# Patient Record
Sex: FEMALE | Race: WHITE | NOT HISPANIC OR LATINO | Employment: OTHER | URBAN - METROPOLITAN AREA
[De-identification: names, ages, dates, MRNs, and addresses within clinical notes are randomized per-mention and may not be internally consistent; named-entity substitution may affect disease eponyms.]

---

## 2023-06-29 DIAGNOSIS — Z78.0 POST-MENOPAUSE: ICD-10-CM

## 2023-06-29 DIAGNOSIS — Z12.31 ENCOUNTER FOR SCREENING MAMMOGRAM FOR MALIGNANT NEOPLASM OF BREAST: ICD-10-CM

## 2023-07-10 ENCOUNTER — TELEPHONE (OUTPATIENT)
Dept: PRIMARY CARE | Facility: CLINIC | Age: 83
End: 2023-07-10
Payer: MEDICARE

## 2023-07-10 PROBLEM — Z95.2 S/P TAVR (TRANSCATHETER AORTIC VALVE REPLACEMENT): Status: ACTIVE | Noted: 2023-07-10

## 2023-07-10 PROBLEM — R63.4 WEIGHT LOSS, UNINTENTIONAL: Status: ACTIVE | Noted: 2023-07-10

## 2023-07-10 PROBLEM — M20.019 MALLET FINGER: Status: ACTIVE | Noted: 2023-07-10

## 2023-07-10 PROBLEM — I35.0 AORTIC STENOSIS: Status: ACTIVE | Noted: 2023-07-10

## 2023-07-10 PROBLEM — E55.9 VITAMIN D DEFICIENCY: Status: ACTIVE | Noted: 2023-07-10

## 2023-07-10 PROBLEM — J90 PLEURAL EFFUSION, BILATERAL: Status: ACTIVE | Noted: 2023-07-10

## 2023-07-10 PROBLEM — M81.0 OSTEOPOROSIS, POST-MENOPAUSAL: Status: ACTIVE | Noted: 2023-07-10

## 2023-07-10 PROBLEM — F41.8 SITUATIONAL ANXIETY: Status: ACTIVE | Noted: 2023-07-10

## 2023-07-10 PROBLEM — N32.81 OVERACTIVE BLADDER: Status: ACTIVE | Noted: 2023-07-10

## 2023-07-10 PROBLEM — R10.13 DYSPEPSIA: Status: ACTIVE | Noted: 2023-07-10

## 2023-07-10 PROBLEM — C43.61 MALIGNANT MELANOMA OF RIGHT FOREARM (MULTI): Status: ACTIVE | Noted: 2023-07-10

## 2023-07-10 PROBLEM — R91.1 LUNG NODULE: Status: ACTIVE | Noted: 2023-07-10

## 2023-07-10 PROBLEM — M51.36 LUMBAR DEGENERATIVE DISC DISEASE: Status: ACTIVE | Noted: 2023-07-10

## 2023-07-10 PROBLEM — M51.369 LUMBAR DEGENERATIVE DISC DISEASE: Status: ACTIVE | Noted: 2023-07-10

## 2023-07-10 PROBLEM — I10 HYPERTENSION: Status: ACTIVE | Noted: 2023-07-10

## 2023-07-10 PROBLEM — R73.03 PREDIABETES: Status: ACTIVE | Noted: 2023-07-10

## 2023-07-10 RX ORDER — BUTALB/ACETAMINOPHEN/CAFFEINE 50-325-40
2 TABLET ORAL DAILY
COMMUNITY
Start: 2015-06-01

## 2023-07-10 RX ORDER — CHOLECALCIFEROL (VITAMIN D3) 25 MCG
1 TABLET ORAL DAILY
COMMUNITY

## 2023-07-10 RX ORDER — POTASSIUM &MAGNESIUM ASPARTATE 250-250 MG
CAPSULE ORAL
COMMUNITY
Start: 2022-09-22

## 2023-07-10 RX ORDER — CLOPIDOGREL BISULFATE 75 MG/1
75 TABLET ORAL DAILY
COMMUNITY
Start: 2021-02-16 | End: 2023-10-25 | Stop reason: ALTCHOICE

## 2023-07-10 RX ORDER — GLUCOSAMINE/CHONDR SU A SOD 167-133 MG
500 CAPSULE ORAL
COMMUNITY
End: 2023-10-25 | Stop reason: ALTCHOICE

## 2023-07-10 RX ORDER — ASPIRIN 81 MG/1
1 TABLET ORAL DAILY
COMMUNITY
Start: 2021-02-16

## 2023-07-10 RX ORDER — METOPROLOL SUCCINATE 25 MG/1
25 TABLET, EXTENDED RELEASE ORAL
COMMUNITY
End: 2023-09-21

## 2023-07-10 RX ORDER — LISINOPRIL 5 MG/1
10 TABLET ORAL DAILY
COMMUNITY
End: 2023-10-25 | Stop reason: SDUPTHER

## 2023-07-10 RX ORDER — ACETAMINOPHEN 325 MG/1
325 TABLET ORAL EVERY 6 HOURS PRN
COMMUNITY
Start: 2021-02-16 | End: 2023-10-25 | Stop reason: ALTCHOICE

## 2023-09-21 DIAGNOSIS — I10 HYPERTENSION, UNSPECIFIED TYPE: ICD-10-CM

## 2023-09-21 RX ORDER — METOPROLOL SUCCINATE 25 MG/1
25 TABLET, EXTENDED RELEASE ORAL
Qty: 90 TABLET | Refills: 3 | Status: SHIPPED | OUTPATIENT
Start: 2023-09-21 | End: 2023-10-25 | Stop reason: ALTCHOICE

## 2023-10-16 ENCOUNTER — LAB (OUTPATIENT)
Dept: LAB | Facility: LAB | Age: 83
End: 2023-10-16
Payer: MEDICARE

## 2023-10-16 DIAGNOSIS — I10 ESSENTIAL (PRIMARY) HYPERTENSION: Primary | ICD-10-CM

## 2023-10-16 LAB
ALBUMIN SERPL BCP-MCNC: 4.2 G/DL (ref 3.4–5)
ALP SERPL-CCNC: 57 U/L (ref 33–136)
ALT SERPL W P-5'-P-CCNC: 14 U/L (ref 7–45)
ANION GAP SERPL CALC-SCNC: 14 MMOL/L (ref 10–20)
AST SERPL W P-5'-P-CCNC: 22 U/L (ref 9–39)
BASOPHILS # BLD AUTO: 0.04 X10*3/UL (ref 0–0.1)
BASOPHILS NFR BLD AUTO: 0.7 %
BILIRUB SERPL-MCNC: 0.6 MG/DL (ref 0–1.2)
BUN SERPL-MCNC: 13 MG/DL (ref 6–23)
CALCIUM SERPL-MCNC: 9.3 MG/DL (ref 8.6–10.3)
CHLORIDE SERPL-SCNC: 99 MMOL/L (ref 98–107)
CHOLEST SERPL-MCNC: 265 MG/DL (ref 0–199)
CHOLESTEROL/HDL RATIO: 3.2
CO2 SERPL-SCNC: 28 MMOL/L (ref 21–32)
CREAT SERPL-MCNC: 0.8 MG/DL (ref 0.5–1.05)
EOSINOPHIL # BLD AUTO: 0.14 X10*3/UL (ref 0–0.4)
EOSINOPHIL NFR BLD AUTO: 2.5 %
ERYTHROCYTE [DISTWIDTH] IN BLOOD BY AUTOMATED COUNT: 12.8 % (ref 11.5–14.5)
GFR SERPL CREATININE-BSD FRML MDRD: 73 ML/MIN/1.73M*2
GLUCOSE SERPL-MCNC: 83 MG/DL (ref 74–99)
HCT VFR BLD AUTO: 40.8 % (ref 36–46)
HDLC SERPL-MCNC: 83.2 MG/DL
HGB BLD-MCNC: 13 G/DL (ref 12–16)
IMM GRANULOCYTES # BLD AUTO: 0.01 X10*3/UL (ref 0–0.5)
IMM GRANULOCYTES NFR BLD AUTO: 0.2 % (ref 0–0.9)
LDLC SERPL CALC-MCNC: 167 MG/DL
LYMPHOCYTES # BLD AUTO: 2.25 X10*3/UL (ref 0.8–3)
LYMPHOCYTES NFR BLD AUTO: 40.6 %
MCH RBC QN AUTO: 30.3 PG (ref 26–34)
MCHC RBC AUTO-ENTMCNC: 31.9 G/DL (ref 32–36)
MCV RBC AUTO: 95 FL (ref 80–100)
MONOCYTES # BLD AUTO: 0.37 X10*3/UL (ref 0.05–0.8)
MONOCYTES NFR BLD AUTO: 6.7 %
NEUTROPHILS # BLD AUTO: 2.73 X10*3/UL (ref 1.6–5.5)
NEUTROPHILS NFR BLD AUTO: 49.3 %
NON HDL CHOLESTEROL: 182 MG/DL (ref 0–149)
NRBC BLD-RTO: 0 /100 WBCS (ref 0–0)
PLATELET # BLD AUTO: 281 X10*3/UL (ref 150–450)
PMV BLD AUTO: 10.4 FL (ref 7.5–11.5)
POTASSIUM SERPL-SCNC: 4.5 MMOL/L (ref 3.5–5.3)
PROT SERPL-MCNC: 6.7 G/DL (ref 6.4–8.2)
RBC # BLD AUTO: 4.29 X10*6/UL (ref 4–5.2)
SODIUM SERPL-SCNC: 136 MMOL/L (ref 136–145)
TRIGL SERPL-MCNC: 72 MG/DL (ref 0–149)
VLDL: 14 MG/DL (ref 0–40)
WBC # BLD AUTO: 5.5 X10*3/UL (ref 4.4–11.3)

## 2023-10-16 PROCEDURE — 80053 COMPREHEN METABOLIC PANEL: CPT

## 2023-10-16 PROCEDURE — 80061 LIPID PANEL: CPT

## 2023-10-16 PROCEDURE — 36415 COLL VENOUS BLD VENIPUNCTURE: CPT

## 2023-10-16 PROCEDURE — 85025 COMPLETE CBC W/AUTO DIFF WBC: CPT

## 2023-10-25 ENCOUNTER — OFFICE VISIT (OUTPATIENT)
Dept: PRIMARY CARE | Facility: CLINIC | Age: 83
End: 2023-10-25
Payer: MEDICARE

## 2023-10-25 VITALS
DIASTOLIC BLOOD PRESSURE: 68 MMHG | RESPIRATION RATE: 16 BRPM | BODY MASS INDEX: 24.47 KG/M2 | SYSTOLIC BLOOD PRESSURE: 120 MMHG | HEIGHT: 61 IN | WEIGHT: 129.6 LBS | TEMPERATURE: 98.5 F | HEART RATE: 65 BPM | OXYGEN SATURATION: 97 %

## 2023-10-25 DIAGNOSIS — K59.00 CONSTIPATION, UNSPECIFIED CONSTIPATION TYPE: ICD-10-CM

## 2023-10-25 DIAGNOSIS — Z78.9 NEVER SMOKED CIGARETTES: ICD-10-CM

## 2023-10-25 DIAGNOSIS — N32.81 OVERACTIVE BLADDER: ICD-10-CM

## 2023-10-25 DIAGNOSIS — M81.0 AGE RELATED OSTEOPOROSIS, UNSPECIFIED PATHOLOGICAL FRACTURE PRESENCE: ICD-10-CM

## 2023-10-25 DIAGNOSIS — N39.46 MIXED STRESS AND URGE URINARY INCONTINENCE: ICD-10-CM

## 2023-10-25 DIAGNOSIS — R21 RASH: ICD-10-CM

## 2023-10-25 DIAGNOSIS — I10 PRIMARY HYPERTENSION: ICD-10-CM

## 2023-10-25 DIAGNOSIS — Z00.00 MEDICARE ANNUAL WELLNESS VISIT, SUBSEQUENT: ICD-10-CM

## 2023-10-25 DIAGNOSIS — H25.033 ANTERIOR SUBCAPSULAR POLAR AGE-RELATED CATARACT OF BOTH EYES: Primary | ICD-10-CM

## 2023-10-25 PROBLEM — Z85.828 PERSONAL HISTORY OF OTHER MALIGNANT NEOPLASM OF SKIN: Status: ACTIVE | Noted: 2019-08-22

## 2023-10-25 PROBLEM — R11.10 RETCHING: Status: ACTIVE | Noted: 2023-10-25

## 2023-10-25 PROBLEM — L73.8 OTHER SPECIFIED FOLLICULAR DISORDERS: Status: ACTIVE | Noted: 2019-08-22

## 2023-10-25 PROBLEM — D49.2 NEOPLASM OF UNSPECIFIED BEHAVIOR OF BONE, SOFT TISSUE, AND SKIN: Status: ACTIVE | Noted: 2019-08-22

## 2023-10-25 PROBLEM — R79.89 ABNORMAL CBC: Status: ACTIVE | Noted: 2023-10-25

## 2023-10-25 PROBLEM — L57.0 ACTINIC KERATOSIS: Status: ACTIVE | Noted: 2019-08-22

## 2023-10-25 PROBLEM — D22.5 MELANOCYTIC NEVI OF TRUNK: Status: ACTIVE | Noted: 2019-08-22

## 2023-10-25 PROBLEM — I87.2 CHRONIC VENOUS INSUFFICIENCY: Status: ACTIVE | Noted: 2019-08-22

## 2023-10-25 PROBLEM — L30.1 DYSHIDROSIS (POMPHOLYX): Status: ACTIVE | Noted: 2019-08-22

## 2023-10-25 PROBLEM — L60.1 ONYCHOLYSIS: Status: ACTIVE | Noted: 2019-08-22

## 2023-10-25 PROBLEM — E78.5 DYSLIPIDEMIA: Status: ACTIVE | Noted: 2023-10-25

## 2023-10-25 PROCEDURE — 1159F MED LIST DOCD IN RCRD: CPT | Performed by: FAMILY MEDICINE

## 2023-10-25 PROCEDURE — G0446 INTENS BEHAVE THER CARDIO DX: HCPCS | Performed by: FAMILY MEDICINE

## 2023-10-25 PROCEDURE — 1126F AMNT PAIN NOTED NONE PRSNT: CPT | Performed by: FAMILY MEDICINE

## 2023-10-25 PROCEDURE — 99397 PER PM REEVAL EST PAT 65+ YR: CPT | Performed by: FAMILY MEDICINE

## 2023-10-25 PROCEDURE — 1170F FXNL STATUS ASSESSED: CPT | Performed by: FAMILY MEDICINE

## 2023-10-25 PROCEDURE — 3074F SYST BP LT 130 MM HG: CPT | Performed by: FAMILY MEDICINE

## 2023-10-25 PROCEDURE — 1036F TOBACCO NON-USER: CPT | Performed by: FAMILY MEDICINE

## 2023-10-25 PROCEDURE — 3078F DIAST BP <80 MM HG: CPT | Performed by: FAMILY MEDICINE

## 2023-10-25 PROCEDURE — 99214 OFFICE O/P EST MOD 30 MIN: CPT | Performed by: FAMILY MEDICINE

## 2023-10-25 PROCEDURE — G0439 PPPS, SUBSEQ VISIT: HCPCS | Performed by: FAMILY MEDICINE

## 2023-10-25 RX ORDER — BIOTIN 5 MG
5 TABLET ORAL
COMMUNITY
End: 2023-10-25 | Stop reason: ALTCHOICE

## 2023-10-25 RX ORDER — LISINOPRIL 10 MG/1
10 TABLET ORAL DAILY
Qty: 90 TABLET
Start: 2023-10-25 | End: 2024-10-24

## 2023-10-25 RX ORDER — HYDROCORTISONE 25 MG/G
OINTMENT TOPICAL 2 TIMES DAILY PRN
Qty: 30 G | Refills: 2 | Status: SHIPPED | OUTPATIENT
Start: 2023-10-25 | End: 2024-10-24

## 2023-10-25 ASSESSMENT — ANXIETY QUESTIONNAIRES
7. FEELING AFRAID AS IF SOMETHING AWFUL MIGHT HAPPEN: NOT AT ALL
6. BECOMING EASILY ANNOYED OR IRRITABLE: SEVERAL DAYS
2. NOT BEING ABLE TO STOP OR CONTROL WORRYING: SEVERAL DAYS
3. WORRYING TOO MUCH ABOUT DIFFERENT THINGS: NOT AT ALL
GAD7 TOTAL SCORE: 4
1. FEELING NERVOUS, ANXIOUS, OR ON EDGE: MORE THAN HALF THE DAYS
5. BEING SO RESTLESS THAT IT IS HARD TO SIT STILL: NOT AT ALL
4. TROUBLE RELAXING: NOT AT ALL
IF YOU CHECKED OFF ANY PROBLEMS ON THIS QUESTIONNAIRE, HOW DIFFICULT HAVE THESE PROBLEMS MADE IT FOR YOU TO DO YOUR WORK, TAKE CARE OF THINGS AT HOME, OR GET ALONG WITH OTHER PEOPLE: SOMEWHAT DIFFICULT

## 2023-10-25 ASSESSMENT — ENCOUNTER SYMPTOMS
CHILLS: 0
ABDOMINAL PAIN: 0
BRUISES/BLEEDS EASILY: 0
NECK STIFFNESS: 0
BACK PAIN: 0
POLYDIPSIA: 0
EYE PAIN: 0
HALLUCINATIONS: 0
ADENOPATHY: 0
FEVER: 0
WEAKNESS: 0
LOSS OF SENSATION IN FEET: 0
HEADACHES: 0
WOUND: 0
BLOOD IN STOOL: 0
DEPRESSION: 0
SHORTNESS OF BREATH: 0
DYSURIA: 0
EYE REDNESS: 0
PALPITATIONS: 0
OCCASIONAL FEELINGS OF UNSTEADINESS: 0
FATIGUE: 0
COUGH: 0
RHINORRHEA: 0
HEMATURIA: 0
SORE THROAT: 0

## 2023-10-25 ASSESSMENT — PATIENT HEALTH QUESTIONNAIRE - PHQ9
SUM OF ALL RESPONSES TO PHQ QUESTIONS 1-9: 7
8. MOVING OR SPEAKING SO SLOWLY THAT OTHER PEOPLE COULD HAVE NOTICED. OR THE OPPOSITE, BEING SO FIGETY OR RESTLESS THAT YOU HAVE BEEN MOVING AROUND A LOT MORE THAN USUAL: NOT AT ALL
10. IF YOU CHECKED OFF ANY PROBLEMS, HOW DIFFICULT HAVE THESE PROBLEMS MADE IT FOR YOU TO DO YOUR WORK, TAKE CARE OF THINGS AT HOME, OR GET ALONG WITH OTHER PEOPLE: SOMEWHAT DIFFICULT
SUM OF ALL RESPONSES TO PHQ9 QUESTIONS 1 AND 2: 4
5. POOR APPETITE OR OVEREATING: NOT AT ALL
3. TROUBLE FALLING OR STAYING ASLEEP OR SLEEPING TOO MUCH: MORE THAN HALF THE DAYS
2. FEELING DOWN, DEPRESSED OR HOPELESS: MORE THAN HALF THE DAYS
1. LITTLE INTEREST OR PLEASURE IN DOING THINGS: MORE THAN HALF THE DAYS
6. FEELING BAD ABOUT YOURSELF - OR THAT YOU ARE A FAILURE OR HAVE LET YOURSELF OR YOUR FAMILY DOWN: NOT AT ALL
4. FEELING TIRED OR HAVING LITTLE ENERGY: NOT AT ALL
9. THOUGHTS THAT YOU WOULD BE BETTER OFF DEAD, OR OF HURTING YOURSELF: NOT AT ALL
7. TROUBLE CONCENTRATING ON THINGS, SUCH AS READING THE NEWSPAPER OR WATCHING TELEVISION: SEVERAL DAYS

## 2023-10-25 ASSESSMENT — ACTIVITIES OF DAILY LIVING (ADL)
DRESSING: INDEPENDENT
TAKING_MEDICATION: INDEPENDENT
MANAGING_FINANCES: INDEPENDENT
BATHING: INDEPENDENT
GROCERY_SHOPPING: INDEPENDENT
DOING_HOUSEWORK: INDEPENDENT

## 2023-10-25 NOTE — PROGRESS NOTES
"Subjective   Reason for Visit: Joan Brown is an 82 y.o. female here for a Medicare Wellness visit.    And recheck on HTN and OAB.     Past Medical, Surgical, and Family History reviewed and updated in chart.    Reviewed all medications by prescribing practitioner or clinical pharmacist (such as prescriptions, OTCs, herbal therapies and supplements) and documented in the medical record.        Patient Care Team:  Dionisio Abreu MD as PCP - General  Dionisio Abreu MD as PCP - [employer] PCP     Review of Systems   Constitutional:  Negative for chills, fatigue and fever.   HENT:  Negative for rhinorrhea and sore throat.    Eyes:  Negative for pain and redness.   Respiratory:  Negative for cough and shortness of breath.    Cardiovascular:  Negative for chest pain and palpitations.   Gastrointestinal:  Negative for abdominal pain and blood in stool.   Endocrine: Negative for polydipsia and polyuria.   Genitourinary:  Negative for dysuria and hematuria.   Musculoskeletal:  Negative for back pain and neck stiffness.   Skin:  Negative for rash and wound.   Allergic/Immunologic: Negative for environmental allergies and food allergies.   Neurological:  Negative for weakness and headaches.   Hematological:  Negative for adenopathy. Does not bruise/bleed easily.   Psychiatric/Behavioral:  Negative for hallucinations and suicidal ideas.        Objective   Vitals:  /68 (BP Location: Right arm, Patient Position: Sitting, BP Cuff Size: Adult)   Pulse 65   Temp 36.9 °C (98.5 °F) (Temporal)   Resp 16   Ht 1.549 m (5' 1\")   Wt 58.8 kg (129 lb 9.6 oz)   SpO2 97%   BMI 24.49 kg/m²       Physical Exam  Vitals reviewed.   Constitutional:       General: She is not in acute distress.     Appearance: She is not ill-appearing.   HENT:      Head: Normocephalic and atraumatic.      Right Ear: Tympanic membrane normal.      Left Ear: Tympanic membrane normal.      Nose: No congestion or rhinorrhea.     "  Mouth/Throat:      Pharynx: No oropharyngeal exudate or posterior oropharyngeal erythema.   Eyes:      Extraocular Movements: Extraocular movements intact.      Conjunctiva/sclera: Conjunctivae normal.      Pupils: Pupils are equal, round, and reactive to light.   Cardiovascular:      Rate and Rhythm: Normal rate and regular rhythm.      Heart sounds: No murmur heard.     No friction rub. No gallop.   Pulmonary:      Effort: Pulmonary effort is normal.      Breath sounds: Normal breath sounds. No wheezing, rhonchi or rales.   Abdominal:      General: There is no distension.      Palpations: Abdomen is soft.      Tenderness: There is no abdominal tenderness. There is no guarding or rebound.   Musculoskeletal:         General: No swelling or deformity.      Cervical back: Normal range of motion and neck supple.      Right lower leg: No edema.      Left lower leg: No edema.   Skin:     Capillary Refill: Capillary refill takes less than 2 seconds.      Coloration: Skin is not jaundiced.      Findings: No rash.   Neurological:      General: No focal deficit present.      Mental Status: She is alert.      Motor: No weakness.   Psychiatric:         Mood and Affect: Mood normal.         Behavior: Behavior normal.       Assessment/Plan   Problem List Items Addressed This Visit       Hypertension    Overview     TOPROL  LISINOPRIL         Relevant Medications    lisinopril 10 mg tablet    Overactive bladder    Overview     Overactive bladder -- constipation and dry mouth on anticholinergics.  Never really helped.    Incontinence -- requires daily pad.     Refer to Dr Urena.          Relevant Orders    Referral to Urogynecology    Medicare annual wellness visit, subsequent    Overview     10/25/23 Wellness/cpe covered by [facility] -- annual mammo. every 2 year dexa has been stable. Screening colonoscopy -- no further screening required after age 75.      (Bill 9/2020) -- just had services in 2023 d/t covid.     UTD per  patient on pneumonia and tetanus, zostavax, vaccines. Prevnar 4/11/17.     Pain level 0/10.      10/25/23 Medicare wellness subsequent eval -- no falls, no dementia, no depression, vaccines utd, no loss of adls. No alcohol. no tobacco. no opioids. No hospitalization.      10/25/23 I spent 15 minutes face-to-face with this individual discussing their cardiovascular risk and behavioral therapies of nutritional choices, exercise, and elimination of habits contributing to risk. We agreed on a plan addressing reduction of their current cardiovascular risk. Patient's 10 year CV risk estimate calculates to: 3.2 %      # Left great toe 1st PIP valgus deformity -- to see DPM Dr Perez.      # Muscle cramping -- potassium on low side -- encourage OTC Magnesium/Potassium supplement at bedtime.     # Left foot plantar wart -- treated per podiatry Dr Perez.     # Right eye skin tag -- tiny not bothering her -- Dr Mojica did not think it needed to remove.   # Hx BPPV -- stable now.   # Hx of lymphoma -- follow up with Dr Law in May. Completed chemo 2012.    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   # hx Right ring finger palmar tendon contracture -- referred to hand surgeon.          Constipation    Overview     Using senna tea. Plus 3 colace caps.  Every 3rd day.     Try bulk laxative.          Anterior subcapsular polar age-related cataract of both eyes - Primary    Overview     Plans removal 11/6 and 11/30 per Dr MOJICA.           Other Visit Diagnoses       Body mass index (BMI) of 24.0 to 24.9 in adult        Never smoked cigarettes        Rash        Relevant Medications    hydrocortisone 2.5 % ointment    Mixed stress and urge urinary incontinence        Age related osteoporosis, unspecified pathological fracture presence        Relevant Medications    denosumab (Prolia) 60 mg/mL syringe

## 2023-11-21 ENCOUNTER — CLINICAL SUPPORT (OUTPATIENT)
Dept: PRIMARY CARE | Facility: CLINIC | Age: 83
End: 2023-11-21
Payer: MEDICARE

## 2023-11-21 DIAGNOSIS — M81.0 OSTEOPOROSIS, POST-MENOPAUSAL: ICD-10-CM

## 2023-11-21 PROCEDURE — 96372 THER/PROPH/DIAG INJ SC/IM: CPT | Performed by: FAMILY MEDICINE

## 2024-04-06 ENCOUNTER — HOSPITAL ENCOUNTER (EMERGENCY)
Facility: HOSPITAL | Age: 84
Discharge: HOME | End: 2024-04-06
Payer: MEDICARE

## 2024-04-06 ENCOUNTER — APPOINTMENT (OUTPATIENT)
Dept: RADIOLOGY | Facility: HOSPITAL | Age: 84
End: 2024-04-06
Payer: MEDICARE

## 2024-04-06 VITALS
HEIGHT: 61 IN | OXYGEN SATURATION: 95 % | TEMPERATURE: 98.1 F | SYSTOLIC BLOOD PRESSURE: 158 MMHG | HEART RATE: 78 BPM | WEIGHT: 125 LBS | BODY MASS INDEX: 23.6 KG/M2 | DIASTOLIC BLOOD PRESSURE: 84 MMHG | RESPIRATION RATE: 16 BRPM
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
## 2024-04-06 DIAGNOSIS — S01.511A LIP LACERATION, INITIAL ENCOUNTER: ICD-10-CM

## 2024-04-06 DIAGNOSIS — S09.93XA FACIAL INJURY, INITIAL ENCOUNTER: ICD-10-CM

## 2024-04-06 PROCEDURE — 70450 CT HEAD/BRAIN W/O DYE: CPT

## 2024-04-06 PROCEDURE — 70486 CT MAXILLOFACIAL W/O DYE: CPT

## 2024-04-06 PROCEDURE — 99285 EMERGENCY DEPT VISIT HI MDM: CPT | Mod: 25

## 2024-04-06 PROCEDURE — 70486 CT MAXILLOFACIAL W/O DYE: CPT | Performed by: RADIOLOGY

## 2024-04-06 PROCEDURE — 76377 3D RENDER W/INTRP POSTPROCES: CPT | Performed by: RADIOLOGY

## 2024-04-06 PROCEDURE — 72125 CT NECK SPINE W/O DYE: CPT | Performed by: RADIOLOGY

## 2024-04-06 PROCEDURE — 72125 CT NECK SPINE W/O DYE: CPT

## 2024-04-06 PROCEDURE — 70450 CT HEAD/BRAIN W/O DYE: CPT | Performed by: RADIOLOGY

## 2024-04-06 PROCEDURE — 76377 3D RENDER W/INTRP POSTPROCES: CPT

## 2024-04-06 PROCEDURE — 12011 RPR F/E/E/N/L/M 2.5 CM/<: CPT

## 2024-04-06 PROCEDURE — 2500000001 HC RX 250 WO HCPCS SELF ADMINISTERED DRUGS (ALT 637 FOR MEDICARE OP)

## 2024-04-06 RX ADMIN — Medication 1 APPLICATION: at 11:17

## 2024-04-06 ASSESSMENT — LIFESTYLE VARIABLES
TOTAL SCORE: 0
EVER FELT BAD OR GUILTY ABOUT YOUR DRINKING: NO
EVER HAD A DRINK FIRST THING IN THE MORNING TO STEADY YOUR NERVES TO GET RID OF A HANGOVER: NO
HAVE PEOPLE ANNOYED YOU BY CRITICIZING YOUR DRINKING: NO
HAVE YOU EVER FELT YOU SHOULD CUT DOWN ON YOUR DRINKING: NO

## 2024-04-06 ASSESSMENT — PAIN - FUNCTIONAL ASSESSMENT: PAIN_FUNCTIONAL_ASSESSMENT: 0-10

## 2024-04-06 ASSESSMENT — COLUMBIA-SUICIDE SEVERITY RATING SCALE - C-SSRS
1. IN THE PAST MONTH, HAVE YOU WISHED YOU WERE DEAD OR WISHED YOU COULD GO TO SLEEP AND NOT WAKE UP?: NO
6. HAVE YOU EVER DONE ANYTHING, STARTED TO DO ANYTHING, OR PREPARED TO DO ANYTHING TO END YOUR LIFE?: NO
2. HAVE YOU ACTUALLY HAD ANY THOUGHTS OF KILLING YOURSELF?: NO

## 2024-04-06 ASSESSMENT — PAIN SCALES - GENERAL: PAINLEVEL_OUTOF10: 4

## 2024-04-06 ASSESSMENT — PAIN DESCRIPTION - DESCRIPTORS: DESCRIPTORS: ACHING

## 2024-04-06 ASSESSMENT — PAIN DESCRIPTION - LOCATION: LOCATION: FACE

## 2024-04-06 ASSESSMENT — PAIN DESCRIPTION - PAIN TYPE: TYPE: ACUTE PAIN

## 2024-04-06 NOTE — ED TRIAGE NOTES
Patient tripped on a sidewalk this morning and hit her face on the pavement. Patient has a laceration with swelling to her upper lip. Patient denies LOC or blood thinners.

## 2024-04-06 NOTE — ED NOTES
Pt arrives s/p trip and fall with c/o laceration and swelling to upper lip. Pt additionally with chipped upper right tooth. Pt denies LOC or blood thinning medication. Bleeding controlled at this time. Pt in no obvious distress.      Andrea Guy RN  04/06/24 3006

## 2024-04-06 NOTE — ED PROVIDER NOTES
HPI   Chief Complaint   Patient presents with    Fall       Patient is an 83-year-old female denting to the ED with cc of mechanical fall and injury to the left times today.  Patient states she tripped and hit her face on the sidewalk.  Patient denies any loss of consciousness and is not on any blood thinners.  Patient was able to ambulate after incident occurred.  Endorses pain in her lip.  Patient is up-to-date on her tetanus.  Patient denies any numbness tingling or other symptoms.  Patient has not had any pain medication for symptoms.  Patient denies any tobacco alcohol or street drug abuse                          [place] Coma Scale Score: 15                     Patient History   Past Medical History:   Diagnosis Date    Abnormal levels of other serum enzymes 03/18/2020    Elevated liver enzymes    Cellulitis of left lower limb 01/08/2018    Cellulitis of left lower extremity    Hyperlipidemia, unspecified 10/04/2016    Dyslipidemia (high LDL; low HDL)    Non-Hodgkin lymphoma, unspecified, lymph nodes of multiple sites (CMS/HCC) 04/25/2016    Non-Hodgkin's lymphoma of multiple sites    Personal history of other diseases of the respiratory system 04/25/2016    History of acute pharyngitis    Personal history of other specified conditions 03/18/2020    History of nausea and vomiting    Personal history of other specified conditions 04/25/2016    History of vertigo    Personal history of pneumonia (recurrent) 01/06/2020    History of community acquired pneumonia    Strain of muscle, fascia and tendon of lower back, initial encounter 04/25/2016    Lumbar strain    Urinary tract infection, site not specified 11/18/2019    Acute UTI     Past Surgical History:   Procedure Laterality Date    BREAST LUMPECTOMY  04/25/2016    Left Breast Lumpectomy    CHOLECYSTECTOMY  04/25/2016    Cholecystectomy Laparoscopic    HEMICOLECTOMY  04/25/2016    Hemicolectomy    HYSTERECTOMY  04/25/2016    Hysterectomy     No family history  on file.  Social History     Tobacco Use    Smoking status: Never     Passive exposure: Never    Smokeless tobacco: Never   Vaping Use    Vaping Use: Never used   Substance Use Topics    Alcohol use: Yes     Alcohol/week: 1.0 standard drink of alcohol     Types: 1 Glasses of wine per week     Comment: maybe 1 x a month    Drug use: Never       Physical Exam   ED Triage Vitals [04/06/24 1103]   Temperature Heart Rate Respirations BP   36.8 °C (98.2 °F) 84 18 171/85      Pulse Ox Temp Source Heart Rate Source Patient Position   96 % Temporal Monitor --      BP Location FiO2 (%)     -- --       Physical Exam  HENT:      Head: Normocephalic.      Mouth/Throat:      Mouth: Mucous membranes are moist.      Comments: Maxillary R lateral incisor chipped   Eyes:      Extraocular Movements: Extraocular movements intact.      Pupils: Pupils are equal, round, and reactive to light.   Musculoskeletal:         General: No tenderness. Normal range of motion.   Skin:     Findings: Lesion (1cm laceration horizontally along the vermillion border) present.      Comments: Edema of upper lip, abrasion of chin   Neurological:      General: No focal deficit present.      Mental Status: She is alert and oriented to person, place, and time. Mental status is at baseline.      Cranial Nerves: No cranial nerve deficit.      Sensory: No sensory deficit.      Motor: No weakness.   Psychiatric:         Mood and Affect: Mood normal.         ED Course & MDM   Diagnoses as of 04/06/24 1212   Fall, initial encounter   Lip laceration, initial encounter   Facial injury, initial encounter       Medical Decision Making  Medical Decision Making:  Patient presented as described in HPI. Patient case including ROS, PE, and treatment and plan discussed with ED attending if attached as cosigner. Due to patients presentation orders completed include as documented.  Patient presents to the ED with cc of mechanical fall and injury to the lip times today.  Patient  is not on any blood thinners denies any LOC.  Patient states she tripped on the sidewalk and fell hitting her face.  Patient has not had any pain medication for symptoms and denies any here.  Patient is up-to-date on her tetanus.  Patient is nontoxic-appearing abdomen is soft and nontender lung sounds are clear bilaterally no neurofocal deficits, 1 cm laceration of the upper lip along the vermilion border, is not through the lip, small chip along the bottom of the right maxillary lateral incisor otherwise no tooth abnormalities.  Upper lip is edematous.  Abrasion to the chin with bruising.  No mid spinous or paraspinous tenderness.  Pending imaging.  LET was placed on the lip area was cleansed. 4 sutures were placed. Pending imaging.  Being negative for any acute fractures CT facial bones reveals perimandibular soft tissue swelling and fluid slightly right of midline likely representing hematoma soft tissue contusion.  Premaxillary soft tissue swelling anteriorly with small foci of soft tissue gas likely representing contusion hematoma and laceration.  Patient educated these findings.  Patient educated on suture care and removal.  Patient educated on any worsening symptoms to return.  Patient educated follow-up with primary doctor.  Patient educated on Tylenol ibuprofen for home.  Patient remained stable and discharged.  Patient was advised to follow up with PCP or recommended provider in 2-3 days for another evaluation and exam. I advised patient/guardian to return or go to closest emergency room immediately if symptoms change, get worse, new symptoms develop prior to follow up. If there is no improvement in symptoms in the next 24 hours they are advised to return for further evaluation and exam. I also explained the plan and treatment course. Patient/guardian is in agreement with plan, treatment course, and follow up and states verbally that they will comply.      Patient care discussed with: N/A  Social  Determinants affecting care: N/A    Final diagnosis and disposition as below.  See CI    Homegoing. I discussed the differential; results and discharge plan with the patient and/or family/friend/caregiver if present.  I emphasized the importance of follow-up with the physician I referred them to in the timeframe recommended.  I explained reasons for the patient to return to the Emergency Department. They agreed that if they feel their condition is worsening or if they have any other concern they should call 911 immediately for further assistance. I gave the patient an opportunity to ask all questions they had and answered all of them accordingly. They understand return precautions and discharge instructions. The patient and/or family/friend/caregiver expressed understanding verbally and that they would comply.       Disposition:  Discharge      This note has been transcribed using voice recognition and may contain grammatical errors, misplaced words, incorrect words, incorrect phrases or other errors.        CT head wo IV contrast   Final Result   Age-related changes.. No acute intracranial process.        Pre mandibular soft tissue swelling and fluid slightly right of   midline likely represent soft tissue contusion/hematoma.        Pre maxillary soft tissue swelling anteriorly with small foci of soft   tissue gas likely representing contusion/hematoma and laceration.        No acute facial fracture.        MACRO:   None.        Signed by: Dionisio Price 4/6/2024 12:01 PM   Dictation workstation:   [id number]      CT maxillofacial bones wo IV contrast   Final Result   Age-related changes.. No acute intracranial process.        Pre mandibular soft tissue swelling and fluid slightly right of   midline likely represent soft tissue contusion/hematoma.        Pre maxillary soft tissue swelling anteriorly with small foci of soft   tissue gas likely representing contusion/hematoma and laceration.        No acute facial  fracture.        MACRO:   None.        Signed by: Dionisio Price 4/6/2024 12:01 PM   Dictation workstation:   [id number]      CT cervical spine wo IV contrast   Final Result   Osteopenia with multilevel degenerative disc and facet changes of the   cervical spine most prominent at C4-5 and C5-6.        No acute fracture or subluxation of the cervical spine.                  MACRO:   None.        Signed by: Dionisio Price 4/6/2024 12:03 PM   Dictation workstation:   [id number]      CT 3D reconstruction   Final Result   Age-related changes.. No acute intracranial process.        Pre mandibular soft tissue swelling and fluid slightly right of   midline likely represent soft tissue contusion/hematoma.        Pre maxillary soft tissue swelling anteriorly with small foci of soft   tissue gas likely representing contusion/hematoma and laceration.        No acute facial fracture.        MACRO:   None.        Signed by: Dionisio Price 4/6/2024 12:01 PM   Dictation workstation:   [id number]         Procedure  Laceration Repair    Performed by: Deann Davila PA-C  Authorized by: Lizeth Trejo DO    Consent:     Consent obtained:  Verbal  Anesthesia:     Anesthesia method:  Topical application  Laceration details:     Location:  Lip    Lip location:  Upper exterior lip    Length (cm):  1  Treatment:     Area cleansed with:  Povidone-iodine  Skin repair:     Repair method:  Sutures    Suture size:  6-0    Suture material:  Nylon    Suture technique:  Simple interrupted    Number of sutures:  4  Approximation:     Approximation:  Close  Repair type:     Repair type:  Simple  Post-procedure details:     Dressing:  Open (no dressing)       Deann Davila PA-C  04/06/24 1213

## 2024-04-12 ENCOUNTER — OFFICE VISIT (OUTPATIENT)
Dept: PRIMARY CARE | Facility: CLINIC | Age: 84
End: 2024-04-12
Payer: MEDICARE

## 2024-04-12 VITALS
TEMPERATURE: 97.7 F | BODY MASS INDEX: 24.37 KG/M2 | WEIGHT: 129 LBS | OXYGEN SATURATION: 90 % | RESPIRATION RATE: 16 BRPM | HEART RATE: 73 BPM | SYSTOLIC BLOOD PRESSURE: 112 MMHG | DIASTOLIC BLOOD PRESSURE: 76 MMHG

## 2024-04-12 DIAGNOSIS — S01.81XD FACIAL LACERATION, SUBSEQUENT ENCOUNTER: Primary | ICD-10-CM

## 2024-04-12 DIAGNOSIS — M81.0 AGE RELATED OSTEOPOROSIS, UNSPECIFIED PATHOLOGICAL FRACTURE PRESENCE: ICD-10-CM

## 2024-04-12 PROBLEM — S01.81XA FACE LACERATIONS: Status: ACTIVE | Noted: 2024-04-12

## 2024-04-12 PROCEDURE — 3078F DIAST BP <80 MM HG: CPT | Performed by: FAMILY MEDICINE

## 2024-04-12 PROCEDURE — 3074F SYST BP LT 130 MM HG: CPT | Performed by: FAMILY MEDICINE

## 2024-04-12 PROCEDURE — 1157F ADVNC CARE PLAN IN RCRD: CPT | Performed by: FAMILY MEDICINE

## 2024-04-12 PROCEDURE — 1159F MED LIST DOCD IN RCRD: CPT | Performed by: FAMILY MEDICINE

## 2024-04-12 PROCEDURE — 99213 OFFICE O/P EST LOW 20 MIN: CPT | Performed by: FAMILY MEDICINE

## 2024-04-12 NOTE — PROGRESS NOTES
"Subjective   Patient ID: Joan Brown is a 83 y.o. female who presents for Suture / Staple Removal.    Fell with face lac 4/6/24 -- ct report reviewed no fx.       Objective   Visit Vitals  /76 (BP Location: Left arm, Patient Position: Sitting, BP Cuff Size: Adult)   Pulse 73   Temp 36.5 °C (97.7 °F) (Temporal)   Resp 16   Wt 58.5 kg (129 lb)   SpO2 90%   BMI 24.37 kg/m²   Smoking Status Never   BSA 1.59 m²      O: chipped tooth. No bony point ttp.  4 sutures removed from upper lip.     Lab Results   Component Value Date    WBC 5.5 10/16/2023    HGB 13.0 10/16/2023    HCT 40.8 10/16/2023    MCV 95 10/16/2023     10/16/2023       Chemistry    Lab Results   Component Value Date/Time     10/16/2023 0824    K 4.5 10/16/2023 0824    CL 99 10/16/2023 0824    CO2 28 10/16/2023 0824    BUN 13 10/16/2023 0824    CREATININE 0.80 10/16/2023 0824    Lab Results   Component Value Date/Time    CALCIUM 9.3 10/16/2023 0824    ALKPHOS 57 10/16/2023 0824    AST 22 10/16/2023 0824    ALT 14 10/16/2023 0824    BILITOT 0.6 10/16/2023 0824          Lab Results   Component Value Date    CHOL 265 (H) 10/16/2023    CHOL 253 (H) 10/03/2022    CHOL 211 (H) 08/02/2021     Lab Results   Component Value Date    HDL 83.2 10/16/2023    HDL 84.9 10/03/2022    HDL 89.5 08/02/2021     Lab Results   Component Value Date    LDLCALC 167 (H) 10/16/2023     Lab Results   Component Value Date    TRIG 72 10/16/2023    TRIG 71 10/03/2022    TRIG 48 08/02/2021     No components found for: \"CHOLHDL\"   Lab Results   Component Value Date    HGBA1C 5.7 (A) 10/03/2022       Assessment/Plan   Problem List Items Addressed This Visit       Face lacerations - Primary    Overview     4/6/2024 mechanical fall, no fx  4/12/24 4 sutures removed           Other Visit Diagnoses       Age related osteoporosis, unspecified pathological fracture presence        Relevant Medications    denosumab (Prolia) 60 mg/mL syringe             "

## 2024-04-15 ENCOUNTER — SPECIALTY PHARMACY (OUTPATIENT)
Dept: PHARMACY | Facility: CLINIC | Age: 84
End: 2024-04-15

## 2024-04-18 ENCOUNTER — SPECIALTY PHARMACY (OUTPATIENT)
Dept: PHARMACY | Facility: CLINIC | Age: 84
End: 2024-04-18

## 2024-05-06 ENCOUNTER — SPECIALTY PHARMACY (OUTPATIENT)
Dept: PHARMACY | Facility: CLINIC | Age: 84
End: 2024-05-06

## 2024-05-21 ENCOUNTER — CLINICAL SUPPORT (OUTPATIENT)
Dept: PRIMARY CARE | Facility: CLINIC | Age: 84
End: 2024-05-21
Payer: MEDICARE

## 2024-05-21 PROCEDURE — 96372 THER/PROPH/DIAG INJ SC/IM: CPT | Performed by: FAMILY MEDICINE

## 2024-05-21 NOTE — PROGRESS NOTES
Patient came in for her prolia injection. Medication sent from [facility] pharmacy. Injected into left arm. Patient tolerated well, no complications.

## 2024-07-03 ENCOUNTER — HOSPITAL ENCOUNTER (OUTPATIENT)
Dept: RADIOLOGY | Facility: HOSPITAL | Age: 84
Discharge: HOME | End: 2024-07-03
Payer: MEDICARE

## 2024-07-03 ENCOUNTER — OFFICE VISIT (OUTPATIENT)
Dept: PRIMARY CARE | Facility: CLINIC | Age: 84
End: 2024-07-03
Payer: MEDICARE

## 2024-07-03 VITALS
DIASTOLIC BLOOD PRESSURE: 86 MMHG | HEART RATE: 64 BPM | TEMPERATURE: 98.3 F | SYSTOLIC BLOOD PRESSURE: 122 MMHG | WEIGHT: 128 LBS | OXYGEN SATURATION: 98 % | BODY MASS INDEX: 24.19 KG/M2

## 2024-07-03 DIAGNOSIS — M54.9 BACK PAIN WITH SCIATICA: ICD-10-CM

## 2024-07-03 DIAGNOSIS — M54.30 BACK PAIN WITH SCIATICA: Primary | ICD-10-CM

## 2024-07-03 DIAGNOSIS — M54.9 BACK PAIN WITH SCIATICA: Primary | ICD-10-CM

## 2024-07-03 DIAGNOSIS — Z12.31 ENCOUNTER FOR SCREENING MAMMOGRAM FOR MALIGNANT NEOPLASM OF BREAST: ICD-10-CM

## 2024-07-03 DIAGNOSIS — M54.30 BACK PAIN WITH SCIATICA: ICD-10-CM

## 2024-07-03 DIAGNOSIS — L57.0 ACTINIC KERATOSES: ICD-10-CM

## 2024-07-03 PROCEDURE — 3074F SYST BP LT 130 MM HG: CPT | Performed by: INTERNAL MEDICINE

## 2024-07-03 PROCEDURE — 1159F MED LIST DOCD IN RCRD: CPT | Performed by: INTERNAL MEDICINE

## 2024-07-03 PROCEDURE — 1125F AMNT PAIN NOTED PAIN PRSNT: CPT | Performed by: INTERNAL MEDICINE

## 2024-07-03 PROCEDURE — 3079F DIAST BP 80-89 MM HG: CPT | Performed by: INTERNAL MEDICINE

## 2024-07-03 PROCEDURE — 1157F ADVNC CARE PLAN IN RCRD: CPT | Performed by: INTERNAL MEDICINE

## 2024-07-03 PROCEDURE — 72100 X-RAY EXAM L-S SPINE 2/3 VWS: CPT

## 2024-07-03 PROCEDURE — 72110 X-RAY EXAM L-2 SPINE 4/>VWS: CPT

## 2024-07-03 PROCEDURE — 99214 OFFICE O/P EST MOD 30 MIN: CPT | Performed by: INTERNAL MEDICINE

## 2024-07-03 RX ORDER — DICLOFENAC SODIUM 25 MG/1
25 TABLET, DELAYED RELEASE ORAL 2 TIMES DAILY PRN
Qty: 10 TABLET | Refills: 0 | Status: SHIPPED | OUTPATIENT
Start: 2024-07-03 | End: 2024-07-08

## 2024-07-03 ASSESSMENT — ENCOUNTER SYMPTOMS
POLYPHAGIA: 0
NAUSEA: 0
FEVER: 0
UNEXPECTED WEIGHT CHANGE: 0
NUMBNESS: 0
HEADACHES: 0
CONSTIPATION: 0
WOUND: 0
BACK PAIN: 1
MYALGIAS: 0
COUGH: 0
DIARRHEA: 0
WEAKNESS: 0
SPEECH DIFFICULTY: 0
NECK PAIN: 0
FATIGUE: 0
PALPITATIONS: 0
EYE PAIN: 0
TREMORS: 0
SEIZURES: 0
ADENOPATHY: 0
JOINT SWELLING: 0
NERVOUS/ANXIOUS: 0
CHEST TIGHTNESS: 0
SLEEP DISTURBANCE: 0
VOICE CHANGE: 0
VOMITING: 0
COLOR CHANGE: 0
TROUBLE SWALLOWING: 0
SHORTNESS OF BREATH: 0
ACTIVITY CHANGE: 0
BLOOD IN STOOL: 0
ARTHRALGIAS: 1
ABDOMINAL PAIN: 0
CONFUSION: 0
APPETITE CHANGE: 0
HEMATURIA: 0
DYSURIA: 0
STRIDOR: 0
FLANK PAIN: 0
SINUS PAIN: 0
HALLUCINATIONS: 0
DIZZINESS: 0
POLYDIPSIA: 0
PHOTOPHOBIA: 0
WHEEZING: 0
FACIAL ASYMMETRY: 0
SORE THROAT: 0

## 2024-07-03 ASSESSMENT — PAIN SCALES - GENERAL: PAINLEVEL: 5

## 2024-07-03 NOTE — ASSESSMENT & PLAN NOTE
Because skin growth is not an emergency and she does not want to go back to dermatologist. She has been advised to talk to Dr Abreu, regular pcp of the patient and may freeze the lesions after discussion with you.

## 2024-07-03 NOTE — PROGRESS NOTES
Subjective   Patient ID: Joan Brown is a 83 y.o. female who presents for Wound Check (Wound on long for the past couple of weeks on left calf.) and Back Pain (Pain for the last 2 weeks on the right lower side ).    HPI   Patient presented to the office for the symptoms of low back pain mostly on right side and traveling to [facility]. No motor weakness or gait abnormality. She denied for recent trauma or any known back problem.  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    Patient also has a skin growth on left leg from past several weeks and its dry and hard rock. No discharge or tenderness.  Patient does have history of skin cancer like Melanoma and BCC and she has long standing past history of Sun exposure without suns screen as she grew up in [place].    Review of Systems   Constitutional:  Negative for activity change, appetite change, fatigue, fever and unexpected weight change.   HENT:  Negative for dental problem, ear discharge, hearing loss, nosebleeds, postnasal drip, sinus pain, sore throat, trouble swallowing and voice change.    Eyes:  Negative for photophobia, pain and visual disturbance.   Respiratory:  Negative for cough, chest tightness, shortness of breath, wheezing and stridor.    Cardiovascular:  Negative for chest pain, palpitations and leg swelling.   Gastrointestinal:  Negative for abdominal pain, blood in stool, constipation, diarrhea, nausea and vomiting.   Endocrine: Negative for polydipsia, polyphagia and polyuria.   Genitourinary:  Negative for decreased urine volume, dyspareunia, dysuria, flank pain, hematuria and urgency.   Musculoskeletal:  Positive for arthralgias and back pain. Negative for gait problem, joint swelling, myalgias and neck pain.   Skin:  Negative for color change, rash and wound.   Allergic/Immunologic: Negative for environmental allergies and food allergies.   Neurological:  Negative for dizziness, tremors, seizures, syncope, facial asymmetry,  speech difficulty, weakness, numbness and headaches.   Hematological:  Negative for adenopathy.   Psychiatric/Behavioral:  Negative for behavioral problems, confusion, hallucinations, self-injury, sleep disturbance and suicidal ideas. The patient is not nervous/anxious.      Objective   /86   Pulse 64   Temp 36.8 °C (98.3 °F)   Wt 58.1 kg (128 lb)   SpO2 98%   BMI 24.19 kg/m²     Physical Exam  Constitutional:       General: She is not in acute distress.     Appearance: Normal appearance. She is normal weight. She is not ill-appearing or toxic-appearing.   Pulmonary:      Effort: Pulmonary effort is normal.   Skin:     General: Skin is dry.             Comments: Site of actinin keratosis.   Neurological:      General: No focal deficit present.      Mental Status: She is alert and oriented to person, place, and time.   Psychiatric:         Mood and Affect: Mood normal.         Behavior: Behavior normal.         Thought Content: Thought content normal.         Judgment: Judgment normal.         Assessment/Plan   Problem List Items Addressed This Visit          Skin    Actinic keratoses     Because skin growth is not an emergency and she does not want to go back to dermatologist. She has been advised to talk to Dr Abreu, regular pcp of the patient and may freeze the lesions after discussion with you.          Other Visit Diagnoses       Back pain with sciatica    -  Primary    Relevant Medications    diclofenac (Voltaren) 25 mg EC tablet    Other Relevant Orders    XR lumbar spine 2-3 views    Encounter for screening mammogram for malignant neoplasm of breast        Relevant Orders    BI mammo bilateral screening tomosynthesis

## 2024-07-19 ENCOUNTER — APPOINTMENT (OUTPATIENT)
Dept: PRIMARY CARE | Facility: CLINIC | Age: 84
End: 2024-07-19
Payer: MEDICARE

## 2024-07-19 VITALS
WEIGHT: 128 LBS | HEART RATE: 70 BPM | OXYGEN SATURATION: 96 % | DIASTOLIC BLOOD PRESSURE: 70 MMHG | SYSTOLIC BLOOD PRESSURE: 130 MMHG | BODY MASS INDEX: 24.19 KG/M2 | RESPIRATION RATE: 16 BRPM | TEMPERATURE: 97.6 F

## 2024-07-19 DIAGNOSIS — M51.36 LUMBAR DEGENERATIVE DISC DISEASE: ICD-10-CM

## 2024-07-19 DIAGNOSIS — C44.91 BASAL CELL CARCINOMA (BCC), UNSPECIFIED SITE: Primary | ICD-10-CM

## 2024-07-19 PROBLEM — R11.10 RETCHING: Status: RESOLVED | Noted: 2023-10-25 | Resolved: 2024-07-19

## 2024-07-19 PROBLEM — R10.13 DYSPEPSIA: Status: RESOLVED | Noted: 2023-07-10 | Resolved: 2024-07-19

## 2024-07-19 PROBLEM — R79.89 ABNORMAL CBC: Status: RESOLVED | Noted: 2023-10-25 | Resolved: 2024-07-19

## 2024-07-19 PROCEDURE — 3078F DIAST BP <80 MM HG: CPT | Performed by: FAMILY MEDICINE

## 2024-07-19 PROCEDURE — 1157F ADVNC CARE PLAN IN RCRD: CPT | Performed by: FAMILY MEDICINE

## 2024-07-19 PROCEDURE — 1159F MED LIST DOCD IN RCRD: CPT | Performed by: FAMILY MEDICINE

## 2024-07-19 PROCEDURE — 3075F SYST BP GE 130 - 139MM HG: CPT | Performed by: FAMILY MEDICINE

## 2024-07-19 PROCEDURE — 99213 OFFICE O/P EST LOW 20 MIN: CPT | Performed by: FAMILY MEDICINE

## 2024-07-19 NOTE — PROGRESS NOTES
"Subjective   Patient ID: Joan Brown is a 83 y.o. female who presents for Wound Check.    And follow up on back pain.   Saw Dr Bowden 7/3/24 for dx AK and back pain.     Had been seeing [place] for treatment of skin on legs -- just giving topical moisturizing lotion for $50 with no benefit.     Now with rapidly growing nodule on left leg -- in the past 2 months.     5/20/24 S/p mohs basal cell on back.  Bx 4/25/24.      7/3/24 Lumbar xray -- no fx, spondylosis.   Completed course of naproxyn.     Objective   Visit Vitals  /70 (BP Location: Left arm, Patient Position: Sitting, BP Cuff Size: Adult)   Pulse 70   Temp 36.4 °C (97.6 °F) (Temporal)   Resp 16   Wt 58.1 kg (128 lb)   SpO2 96%   BMI 24.19 kg/m²   Smoking Status Never   BSA 1.58 m²      O: VSS AFEB Awake, Alert, NAD.  No intoxication, withdrawal, or sedation.  Chest CTA.  Heart RRR.  Ext no c/c/e.      Left leg 1 cm solid nodule.  No bleeding.      Lab Results   Component Value Date    WBC 5.5 10/16/2023    HGB 13.0 10/16/2023    HCT 40.8 10/16/2023    MCV 95 10/16/2023     10/16/2023       Chemistry    Lab Results   Component Value Date/Time     10/16/2023 0824    K 4.5 10/16/2023 0824    CL 99 10/16/2023 0824    CO2 28 10/16/2023 0824    BUN 13 10/16/2023 0824    CREATININE 0.80 10/16/2023 0824    Lab Results   Component Value Date/Time    CALCIUM 9.3 10/16/2023 0824    ALKPHOS 57 10/16/2023 0824    AST 22 10/16/2023 0824    ALT 14 10/16/2023 0824    BILITOT 0.6 10/16/2023 0824          Lab Results   Component Value Date    CHOL 265 (H) 10/16/2023    CHOL 253 (H) 10/03/2022    CHOL 211 (H) 08/02/2021     Lab Results   Component Value Date    HDL 83.2 10/16/2023    HDL 84.9 10/03/2022    HDL 89.5 08/02/2021     Lab Results   Component Value Date    LDLCALC 167 (H) 10/16/2023     Lab Results   Component Value Date    TRIG 72 10/16/2023    TRIG 71 10/03/2022    TRIG 48 08/02/2021     No components found for: \"CHOLHDL\"   Lab Results "   Component Value Date    BA1C 5.7 (A) 10/03/2022       Assessment/Plan   Problem List Items Addressed This Visit       Lumbar degenerative disc disease    Overview     7/3/24 XRAY -- lumbar spondylosis and DDD.          BCC (basal cell carcinoma of skin) - Primary    Overview     5/20/24 S/p mohs on back.  (APEX) Bx 4/25/24.     7/19/24 rapid growing BCC vs SCC with large actinic plug --> Dr Mckeon in [place].          Current Assessment & Plan     Refer with image sent by secure WHOOP to Dr Mckeon in [place].          Relevant Orders    Referral to Dermatology

## 2024-07-19 NOTE — Clinical Note
Aleyda, I am sending Joan your way -- needs new dermatologist for follow up on history of melanoma -- but acutely, I need you to see her for rapidly growing 1 cm nodule left leg.  Seems too deep for me to do punch bx safely.  I will text image on epic text. Looks like actinic plug.  Avery SOLANO

## 2024-07-30 ENCOUNTER — TELEPHONE (OUTPATIENT)
Dept: PRIMARY CARE | Facility: CLINIC | Age: 84
End: 2024-07-30
Payer: MEDICARE

## 2024-07-30 NOTE — TELEPHONE ENCOUNTER
Called requesting the back exercises to be mailed to her that was talked about at last office visit.

## 2024-07-31 ENCOUNTER — APPOINTMENT (OUTPATIENT)
Dept: DERMATOLOGY | Facility: CLINIC | Age: 84
End: 2024-07-31
Payer: MEDICARE

## 2024-07-31 DIAGNOSIS — D48.5 NEOPLASM OF UNCERTAIN BEHAVIOR OF SKIN: Primary | ICD-10-CM

## 2024-07-31 DIAGNOSIS — L57.9 SKIN CHANGES DUE TO CHRONIC EXPOSURE TO NONIONIZING RADIATION: ICD-10-CM

## 2024-07-31 DIAGNOSIS — L82.1 SEBORRHEIC KERATOSIS: ICD-10-CM

## 2024-07-31 DIAGNOSIS — C44.91 BASAL CELL CARCINOMA (BCC), UNSPECIFIED SITE: ICD-10-CM

## 2024-07-31 PROCEDURE — 1036F TOBACCO NON-USER: CPT | Performed by: NURSE PRACTITIONER

## 2024-07-31 PROCEDURE — 1160F RVW MEDS BY RX/DR IN RCRD: CPT | Performed by: NURSE PRACTITIONER

## 2024-07-31 PROCEDURE — 99203 OFFICE O/P NEW LOW 30 MIN: CPT | Performed by: NURSE PRACTITIONER

## 2024-07-31 PROCEDURE — 1159F MED LIST DOCD IN RCRD: CPT | Performed by: NURSE PRACTITIONER

## 2024-07-31 PROCEDURE — 1157F ADVNC CARE PLAN IN RCRD: CPT | Performed by: NURSE PRACTITIONER

## 2024-07-31 PROCEDURE — 11103 TANGNTL BX SKIN EA SEP/ADDL: CPT | Performed by: NURSE PRACTITIONER

## 2024-07-31 PROCEDURE — 11102 TANGNTL BX SKIN SINGLE LES: CPT | Performed by: NURSE PRACTITIONER

## 2024-07-31 NOTE — PROGRESS NOTES
How Severe Is Your Rash?: moderate Subjective     Joan Brown is a 83 y.o. female who presents for the following: Suspicious Skin Lesion (Left lower leg. Patient has a history of BCC, SCC, and melanoma.).     Review of Systems:  No other skin or systemic complaints other than what is documented elsewhere in the note.    The following portions of the chart were reviewed this encounter and updated as appropriate:   Tobacco  Allergies  Meds  Problems  Med Hx  Surg Hx         Skin Cancer History  No skin cancer on file.      Specialty Problems          Dermatology Problems    BCC (basal cell carcinoma of skin)     5/20/24 S/p mohs on back.  (APEX) Bx 4/25/24.     7/19/24 rapid growing BCC vs SCC with large actinic plug --> Dr Mckeon in [place].          Actinic keratoses    Dyshidrosis (pompholyx)    Melanocytic nevi of trunk    Onycholysis    Other specified follicular disorders    Personal history of other malignant neoplasm of skin    Malignant melanoma of right forearm (Multi)     treated by Derm ([facility] in [facility])  in 2019             Objective   Well appearing patient in no apparent distress; mood and affect are within normal limits.    A focused skin examination was performed. All findings within normal limits unless otherwise noted below.    Assessment/Plan   1. Neoplasm of uncertain behavior of skin (2)  Mid Tip of Nose  3.5 mm pink papule with ulcer              Lesion biopsy  Type of biopsy: tangential    Informed consent: discussed and consent obtained    Timeout: patient name, date of birth, surgical site, and procedure verified    Procedure prep:  Patient was prepped and draped  Anesthesia: the lesion was anesthetized in a standard fashion    Anesthetic:  1% lidocaine plain local infiltration  Instrument used: DermaBlade    Hemostasis achieved with: electrodesiccation    Outcome: patient tolerated procedure well    Post-procedure details: wound care instructions given    Additional details:  Cleaned area with isopropyl  Is This A New Presentation, Or A Follow-Up?: Rash alcohol prior to anesthesia or biopsy. Applied thin layer of vaseline and covered with bandaid after procedure      Staff Communication: Dermatology Local Anesthesia: 1 % Plain Lidocaine - Amount:0.5 ml    Specimen 1 - Dermatopathology- DERM LAB  Differential Diagnosis: NMSC  Check Margins Yes/No?:    Comments:    Dermpath Lab: Routine Histopathology (formalin-fixed tissue)    Left Lower Leg - Anterior  11 mm erythematous crusty plaque              Lesion biopsy  Type of biopsy: tangential    Informed consent: discussed and consent obtained    Timeout: patient name, date of birth, surgical site, and procedure verified    Procedure prep:  Patient was prepped and draped  Anesthesia: the lesion was anesthetized in a standard fashion    Anesthetic:  1% lidocaine plain local infiltration  Instrument used: DermaBlade    Hemostasis achieved with: electrodesiccation    Outcome: patient tolerated procedure well    Post-procedure details: wound care instructions given    Additional details:  Cleaned area with isopropyl alcohol prior to anesthesia or biopsy. Applied thin layer of vaseline and covered with bandaid after procedure      Staff Communication: Dermatology Local Anesthesia: 1 % Plain Lidocaine - Amount:0.5 ml    Specimen 2 - Dermatopathology- DERM LAB  Differential Diagnosis: NMSC  Check Margins Yes/No?:    Comments:    Dermpath Lab: Routine Histopathology (formalin-fixed tissue)    NUB  - Given uncertainty in clinical diagnosis, shave biopsy is recommended in clinic today.  - The patient expressed understanding, is in agreement with this plan, and wishes to proceed with biopsy.  - Oral and written wound care instructions provided.  - Advised patient that the office will call within 2 weeks to discuss biopsy results.      Related Procedures  Follow Up In Dermatology - Established Patient    2. Seborrheic keratosis  Right Thigh - Anterior  Stuck on verrucous, tan-brown papules and plaques.      Seborrheic keratoses  are common noncancerous (benign) growths of unknown cause seen in adults due to a thickening of an area of the top skin layer. Seborrheic keratoses may appear as if they are stuck on to the skin. They have distinct borders, and they may appear as papules (small, solid bumps) or plaques (solid, raised patches that are bigger than a thumbnail). They may be the same color as your skin, or they may be pink, light brown, darker brown, or very dark brown, or sometimes may appear black.    There is no way to prevent new seborrheic keratoses from forming. Seborrheic keratoses can be removed, but removal is considered a cosmetic issue and is usually not covered by insurance.    PLAN  No treatment is needed unless there is irritation from clothing, such as itching or bleeding.  2.   Some lotions containing alpha hydroxy acids, salicylic acid, or urea may make the areas feel smoother with regular use but will not eliminate them.    Related Procedures  Follow Up In Dermatology - Established Patient    3. Skin changes due to chronic exposure to nonionizing radiation  Actinic changes in the form of freckles, lentigines and hyper/hypopigmentation     ABCDEs of melanoma and atypical moles were discussed with the patient.    Patient was instructed to perform monthly self skin examination.  We recommended that the patient have regular full skin exams given an increased risk of subsequent skin cancers.    The patient was instructed to use sun protective behaviors including use of broad spectrum sunscreens and sun protective clothing to reduce risk of skin cancers.    Warning signs of non-melanoma skin cancer discussed.    Related Procedures  Follow Up In Dermatology - Established Patient        Return in 3 months for routine skin check or return to clinic sooner if needed

## 2024-07-31 NOTE — PATIENT INSTRUCTIONS

## 2024-08-02 LAB
LABORATORY COMMENT REPORT: NORMAL
PATH REPORT.FINAL DX SPEC: NORMAL
PATH REPORT.GROSS SPEC: NORMAL
PATH REPORT.MICROSCOPIC SPEC OTHER STN: NORMAL
PATH REPORT.RELEVANT HX SPEC: NORMAL
PATH REPORT.TOTAL CANCER: NORMAL

## 2024-08-08 ENCOUNTER — TELEPHONE (OUTPATIENT)
Dept: DERMATOLOGY | Facility: CLINIC | Age: 84
End: 2024-08-08
Payer: MEDICARE

## 2024-08-09 ENCOUNTER — HOSPITAL ENCOUNTER (OUTPATIENT)
Dept: RADIOLOGY | Facility: HOSPITAL | Age: 84
Discharge: HOME | End: 2024-08-09
Payer: MEDICARE

## 2024-08-09 VITALS — HEIGHT: 61 IN | BODY MASS INDEX: 24.17 KG/M2 | WEIGHT: 128 LBS

## 2024-08-09 DIAGNOSIS — Z12.31 ENCOUNTER FOR SCREENING MAMMOGRAM FOR MALIGNANT NEOPLASM OF BREAST: ICD-10-CM

## 2024-08-09 PROCEDURE — 77067 SCR MAMMO BI INCL CAD: CPT | Performed by: STUDENT IN AN ORGANIZED HEALTH CARE EDUCATION/TRAINING PROGRAM

## 2024-08-09 PROCEDURE — 77063 BREAST TOMOSYNTHESIS BI: CPT | Performed by: STUDENT IN AN ORGANIZED HEALTH CARE EDUCATION/TRAINING PROGRAM

## 2024-08-09 PROCEDURE — 77067 SCR MAMMO BI INCL CAD: CPT

## 2024-08-20 ENCOUNTER — APPOINTMENT (OUTPATIENT)
Dept: DERMATOLOGY | Facility: CLINIC | Age: 84
End: 2024-08-20
Payer: MEDICARE

## 2024-08-20 DIAGNOSIS — C44.729 SQUAMOUS CELL CARCINOMA OF SKIN OF LEFT LOWER LIMB, INCLUDING HIP: ICD-10-CM

## 2024-08-20 PROCEDURE — 17313 MOHS 1 STAGE T/A/L: CPT | Performed by: DERMATOLOGY

## 2024-08-20 PROCEDURE — 99214 OFFICE O/P EST MOD 30 MIN: CPT | Performed by: DERMATOLOGY

## 2024-08-20 NOTE — PROGRESS NOTES
Mohs Surgery Operative Note    Date of Surgery:  8/20/2024  Surgeon:  Carolyne Sheppard MD  Office Location:  [address] [facility]  [address]  [address]  [place] [address]  Dept: [phone]  Dept Fax: [phone]  Referring Provider: Augusto Cohn, APRN-CNP  [address] [facility] [address]  [place] [address]      Assessment/Plan   Pre-procedure:   Obtained informed consent: written from patient  The surgical site was identified and confirmed with the patient.     Intra-operative:   Audible time out called at : 8:24 AM 08/20/24  by: Karla Baltazar RN   Verified patient name, birthdate, site, specimen bottle label & requisition.    The planned procedure(s) was again reviewed with the patient. The risks of bleeding, infection, nerve damage and scarring were reviewed. Written authorization was obtained. The patient identity, surgical site, and planned procedure(s) were verified. The provider acted as both surgeon and pathologist.     Squamous cell carcinoma of skin of left lower limb, including hip  Left Lower Leg - Anterior  Mohs surgery  Consent obtained: written    Universal Protocol:  Procedure explained and questions answered to patient or proxy's satisfaction: Yes    Test results available and properly labeled: Yes    Pathology report reviewed: Yes    External notes reviewed: Yes    Photo or diagram used for site identification: Yes    Site/side marked: Yes    Slide independently reviewed by Mohs surgeon: Yes    Immediately prior to procedure a time out was called: Yes    Patient identity confirmed: verbally with patient  Preparation: Patient was prepped and draped in usual sterile fashion      Anticoagulation:  Is the patient taking prescription anticoagulant and/or aspirin prescribed/recommended by a physician? Yes    Was the anticoagulation regimen changed prior to Mohs? No      Anesthesia:  Anesthesia method: local infiltration  Local anesthetic: lidocaine 2% WITH  epi    Procedure Details:  Case ID Number: [id number]  Biopsy accession number: [id number]  Date of biopsy: 7/31/2024  Pre-Op diagnosis: squamous cell carcinoma  Surgery side: left  Surgical site (from skin exam): Left Lower Leg - Anterior  Pre-operative length (cm): 2.7  Pre-operative width (cm): 2  Indications for Mohs surgery: anatomic location where tissue conservation is critical and tumor size greater than 2 cm  Previously treated? No      Micrographic Surgery Details:  Post-operative length (cm): 2.7  Post-operative width (cm): 2.1  Number of Mohs stages: 1    Stage 1  Comments: The patient was brought into the operating room and placed in the procedure chair in the appropriate position.  The area positive by previous biopsy was identified and confirmed with the patient. The area of clinically obvious tumor was debulked using a curette and/or scalpel as needed. An incision was made following the Mohs approach through the skin. The specimen was taken to the lab, divided into 1 piece(s) and appropriately chromacoded and processed.  Tumor features identified on Mohs section: no tumor identified  Depth of defect: subcutaneous fat    Patient tolerance of procedure: tolerated well, no immediate complications    Reconstruction:  Was the defect reconstructed?: No    Hemostasis achieved with: Gelfoam and electrodesiccation  Outcome: patient tolerated procedure well with no complications    Post-procedure details: sterile dressing applied and wound care instructions given    Dressing type: Ace wrap, pressure dressing, Gelfoam, petrolatum, Hypafix and Telfa pad    Repair: After a discussion with the patient regarding the options for wound closure, a decision was made to proceed with second intention healing.  Dressing F/U: Surgifoam was placed in the wound. A pressure dressing was placed to help stabilize the wound and to minimize the risk of postoperative bleeding. Wound care was discussed, and the patient was given  written post-operative wound care instructions.               Wound care was discussed, and the patient was given written post-operative wound care instructions.      The patient will follow up with Carolyne Sheppard MD as needed for any post operative problems or concerns, and will follow up with their primary dermatologist as scheduled.

## 2024-08-20 NOTE — PROGRESS NOTES
Office Visit Note  Date: 8/20/2024  Surgeon:  Carolyne Sheppard MD  Office Location:  [address] [facility]  [address]  [address]  [facility] [address]  Dept: [phone]  Dept Fax: [phone]  Referring Provider: Augusto Cohn, APRN-CNP  [address]  [address]  [place] [address]    Subjective   Joan Brown is a 83 y.o. female who presents for the following: MOHS Surgery    According to the patient, the lesion has been present for approximately 6 months at the time of diagnosis.  The lesion is painful.  The lesion has not been treated previously.    The patient does not have a pacemaker / defibrillator.  The patient does have a heart valve / joint replacement.    The patient is on blood thinners.  The patient does not have a history of hepatitis B or C.  The patient does not have a history of HIV.  The patient does have a history of immunosuppression (e.g. organ transplantation, malignancy, medications)    Review of Systems:  No other skin or systemic complaints other than what is documented elsewhere in the note.    MEDICAL HISTORY: clinically relevant history including significant past medical history, medications and allergies was reviewed and documented in Epic.    Objective   Well appearing patient in no apparent distress; mood and affect are within normal limits.  Vital signs: See record.  Noted on the Left Lower Leg - Anterior  Is a 2.7 x 2.0 cm scar    The patient confirmed the identified site.    Discussion:  The nature of the diagnosis was explained. The lesion is a skin cancer.  It has a risk of local growth and distant spread. The condition is associated with sun exposure.  Warning signs of non-melanoma skin cancer discussed. Patient was instructed to perform monthly self skin examination.  We recommended that the patient have regular full skin exams given an increased risk of subsequent skin cancers. The patient was instructed to use sun protective behaviors  including use of broad spectrum sunscreens and sun protective clothing to reduce risk of skin cancers.      Risks, benefits, side effects of Mohs surgery were discussed with patient and the patient voiced understanding.  It was explained that even though the cure rate of Mohs is very high it is not 100%. Risks of surgery including but not limited to bleeding, infection, numbness, nerve damage, and scar were reviewed.  Discussion included wound care requirements, activity restrictions, likely scar outcome and time to heal.     After Mohs surgery, the defect may need to be repaired surgically and the scar may be longer than the original lesion.  Reconstruction options, risks, and benefits were reviewed including second intention healing, linear repair (4-1 ratio was explained), local flaps, skin grafts, cartilage grafts and interpolation flaps (the need for multiple surgeries was explained). Possible outcomes were reviewed including likely scar appearance, failure of flap survival, infection, bleeding and the need for revision surgery.     The pathology was reviewed, the photograph was reviewed, and the referring physician's note was reviewed.    Patient elected for Mohs surgery.

## 2024-08-27 ENCOUNTER — APPOINTMENT (OUTPATIENT)
Dept: DERMATOLOGY | Facility: CLINIC | Age: 84
End: 2024-08-27
Payer: MEDICARE

## 2024-08-27 VITALS — DIASTOLIC BLOOD PRESSURE: 76 MMHG | HEART RATE: 71 BPM | SYSTOLIC BLOOD PRESSURE: 196 MMHG

## 2024-08-27 DIAGNOSIS — C44.321 SQUAMOUS CELL CARCINOMA OF SKIN OF NOSE: ICD-10-CM

## 2024-08-27 DIAGNOSIS — C44.311 BASAL CELL CARCINOMA (BCC) OF SKIN OF NOSE: Primary | ICD-10-CM

## 2024-08-27 PROCEDURE — 17312 MOHS ADDL STAGE: CPT | Performed by: DERMATOLOGY

## 2024-08-27 PROCEDURE — 17311 MOHS 1 STAGE H/N/HF/G: CPT | Performed by: DERMATOLOGY

## 2024-08-27 PROCEDURE — 13151 CMPLX RPR E/N/E/L 1.1-2.5 CM: CPT | Performed by: DERMATOLOGY

## 2024-08-27 NOTE — PROGRESS NOTES
Mohs Surgery Operative Note    Date of Surgery:  8/27/2024  Surgeon:  Carolyne Sheppard MD  Office Location:  [address] [facility]  [address]  [address]  [place] [address]  Dept: [phone]  Dept Fax: [phone]  Referring Provider: Augusto Cohn, APRN-CNP  [address] [facility] [address]  [place] [address]      Assessment/Plan   Pre-procedure:   Obtained informed consent: written from patient  The surgical site was identified and confirmed with the patient.     Intra-operative:   Audible time out called at : 10:17 AM 08/27/24  by: Luba aSenz RN   Verified patient name, birthdate, site, specimen bottle label & requisition.    The planned procedure(s) was again reviewed with the patient. The risks of bleeding, infection, nerve damage and scarring were reviewed. Written authorization was obtained. The patient identity, surgical site, and planned procedure(s) were verified. The provider acted as both surgeon and pathologist.     Basal cell carcinoma (BCC) of skin of nose  Mid Tip of Nose  Mohs surgery  Consent obtained: written    Universal Protocol:  Procedure explained and questions answered to patient or proxy's satisfaction: Yes    Test results available and properly labeled: Yes    Pathology report reviewed: Yes    External notes reviewed: Yes    Photo or diagram used for site identification: Yes    Site/side marked: Yes    Slide independently reviewed by Mohs surgeon: Yes    Immediately prior to procedure a time out was called: Yes    Patient identity confirmed: verbally with patient  Preparation: Patient was prepped and draped in usual sterile fashion      Anticoagulation:  Is the patient taking prescription anticoagulant and/or aspirin prescribed/recommended by a physician? Yes    Was the anticoagulation regimen changed prior to Mohs? No      Anesthesia:  Anesthesia method: local infiltration  Local anesthetic: lidocaine 2% WITH epi    Procedure Details:  Case ID Number:  [id number]  Biopsy accession number: [id number]  Date of biopsy: 7/31/2024  Pre-Op diagnosis: basal cell carcinoma  Post-Op diagnosis: same as pre-op diagnosis and squamous cell carcinoma  BCC subtype: infiltrative  Surgery side: midline  Surgical site (from skin exam): Mid Tip of Nose  Pre-operative length (cm): 0.5  Pre-operative width (cm): 0.5  Indications for Mohs surgery: anatomic location where tissue conservation is critical and aggressive histology  Previously treated? No      Micrographic Surgery Details:  Post-operative length (cm): 1.1  Post-operative width (cm): 1  Number of Mohs stages: 3    Stage 1     Comments: The patient was brought into the operating room and placed in the procedure chair in the appropriate position.  The area positive by previous biopsy was identified and confirmed with the patient. The area of clinically obvious tumor was debulked using a curette and/or scalpel as needed. An incision was made following the Mohs approach through the skin. The specimen was taken to the lab, divided into 2 piece(s) and appropriately chromacoded and processed.  Tumor was seen on both the lateral and deep margins as indicated on the on the Mohs map.  Invasive squamous cell carcinoma. Histologic examination revealed atypical keratinocytes with large nuclear to cytoplasmic ratio with areas of keratinization.   Tumor features identified on Mohs section: squamous cell carcinoma      Depth of invasion: dermis    Stage 2     Comments: The area of positivity as noted on the Mohs map in the previous stage was identified and removed using the Mohs technique. The specimen was taken to the lab and appropriately chromacoded and processed in 1 piece(s).  Tumor was seen on both the lateral and deep margins as indicated on the on the Mohs map.  Infiltrative basal cell carcinoma. Histologic examination revealed thin strands and small-angulated aggregates of atypical basaloid cells.  Tumor features identified on Mohs  section: basal carcinoma     Depth of invasion: dermis    Stage 3     Comments: The area of positivity as noted on the Mohs map in the previous stage was identified and removed using the Mohs technique. The specimen was taken to the lab and appropriately chromacoded and processed in 1 piece(s).  Tumor features identified on Mohs section: no tumor identified  Depth of defect: subcutaneous fat    Patient tolerance of procedure: tolerated well, no immediate complications    Reconstruction:  Was the defect reconstructed? Yes    Was reconstruction performed by the same Mohs surgeon? Yes    Setting of reconstruction: outpatient office  When was reconstruction performed? same day  Type of reconstruction: linear  Linear reconstruction: complex  Length of linear repair (cm): 2.2  Subcutaneous Layers (Deep Stitches)   Suture size:  5-0  Suture type:  Vicryl  Stitches:  Buried vertical mattress  Fine/surface layer approximation (top stitches)   Epidermal/Superficial suture size:  5-0  Epidermal/Superficial suture type:  Fast-absorbing gut  Stitches: running subcuticular    Hemostasis achieved with: electrodesiccation  Outcome: patient tolerated procedure well with no complications    Post-procedure details: sterile dressing applied    Dressing type: Telfa pad, pressure dressing and Hypafix      Complex Linear Repair - Wide Undermining:  Given the location and size of the defect, it was determined that a complex layered linear closure was required to restore normal anatomy and function. The repair was considered complex because extensive undermining was required and performed. The amount of undermining performed was greater than the maximum width of the defect as measured perpendicular to the closure line along at least one entire edge of the defect. Standing cutaneous cones were removed using Burow's triangles. The wound edges were brought into close approximation with buried vertical mattress sutures. The remainder of the  wound was then closed with epidermal top sutures.    The final repair measured 2.2 cm                Wound care was discussed, and the patient was given written post-operative wound care instructions.      The patient will follow up with Carolyne Sheppard MD as needed for any post operative problems or concerns, and will follow up with their primary dermatologist as scheduled.

## 2024-08-27 NOTE — PROGRESS NOTES
Office Visit Note  Date: 8/27/2024  Surgeon:  Carolyne Sheppard MD  Office Location:  [address] [facility]  [address]  [address]  [facility] [address]  Dept: [phone]  Dept Fax: [phone]  Referring Provider: Augusto Cohn, APRN-CNP  [address]  [address]  [place] [address]    Subjective   Joan Brown is a 83 y.o. female who presents for the following: MOHS Surgery (Mid Tip of Nose- BCC)    According to the patient, the lesion has been present for approximately 1 month at the time of diagnosis.  The lesion is painful.  The lesion has not been treated previously.    The patient does not have a pacemaker / defibrillator.  The patient does have a heart valve / joint replacement.    The patient is on blood thinners.  The patient does not have a history of hepatitis B or C.  The patient does not have a history of HIV.  The patient does not have a history of immunosuppression (e.g. organ transplantation, malignancy, medications)    Review of Systems:  No other skin or systemic complaints other than what is documented elsewhere in the note.    MEDICAL HISTORY: clinically relevant history including significant past medical history, medications and allergies was reviewed and documented in Epic.    Objective   Well appearing patient in no apparent distress; mood and affect are within normal limits.  Vital signs: See record.  Noted on the Mid Tip of Nose  Is a 0.5 x 0.5 cm scar    The patient confirmed the identified site.    Discussion:  The nature of the diagnosis was explained. The lesion is a skin cancer.  It has a risk of local growth and distant spread. The condition is associated with sun exposure.  Warning signs of non-melanoma skin cancer discussed. Patient was instructed to perform monthly self skin examination.  We recommended that the patient have regular full skin exams given an increased risk of subsequent skin cancers. The patient was instructed to use sun  protective behaviors including use of broad spectrum sunscreens and sun protective clothing to reduce risk of skin cancers.      Risks, benefits, side effects of Mohs surgery were discussed with patient and the patient voiced understanding.  It was explained that even though the cure rate of Mohs is very high it is not 100%. Risks of surgery including but not limited to bleeding, infection, numbness, nerve damage, and scar were reviewed.  Discussion included wound care requirements, activity restrictions, likely scar outcome and time to heal.     After Mohs surgery, the defect may need to be repaired surgically and the scar may be longer than the original lesion.  Reconstruction options, risks, and benefits were reviewed including second intention healing, linear repair (4-1 ratio was explained), local flaps, skin grafts, cartilage grafts and interpolation flaps (the need for multiple surgeries was explained). Possible outcomes were reviewed including likely scar appearance, failure of flap survival, infection, bleeding and the need for revision surgery.     The pathology was reviewed, the photograph was reviewed, and the referring physician's note was reviewed.    Patient elected for Mohs surgery.

## 2024-09-03 DIAGNOSIS — E78.5 DYSLIPIDEMIA: ICD-10-CM

## 2024-09-03 DIAGNOSIS — R73.03 PREDIABETES: ICD-10-CM

## 2024-09-03 DIAGNOSIS — E55.9 VITAMIN D DEFICIENCY: ICD-10-CM

## 2024-09-03 DIAGNOSIS — I10 HYPERTENSION, UNSPECIFIED TYPE: ICD-10-CM

## 2024-09-26 ENCOUNTER — OFFICE VISIT (OUTPATIENT)
Dept: CARDIOLOGY | Facility: HOSPITAL | Age: 84
End: 2024-09-26
Payer: MEDICARE

## 2024-09-26 VITALS
OXYGEN SATURATION: 97 % | HEART RATE: 76 BPM | BODY MASS INDEX: 23.7 KG/M2 | WEIGHT: 125.44 LBS | SYSTOLIC BLOOD PRESSURE: 173 MMHG | DIASTOLIC BLOOD PRESSURE: 71 MMHG

## 2024-09-26 DIAGNOSIS — I10 PRIMARY HYPERTENSION: ICD-10-CM

## 2024-09-26 DIAGNOSIS — Z95.2 S/P TAVR (TRANSCATHETER AORTIC VALVE REPLACEMENT): ICD-10-CM

## 2024-09-26 DIAGNOSIS — I10 HYPERTENSION, UNSPECIFIED TYPE: Primary | ICD-10-CM

## 2024-09-26 LAB
ATRIAL RATE: 68 BPM
P AXIS: 69 DEGREES
P OFFSET: 194 MS
P ONSET: 144 MS
PR INTERVAL: 158 MS
Q ONSET: 223 MS
QRS COUNT: 11 BEATS
QRS DURATION: 70 MS
QT INTERVAL: 408 MS
QTC CALCULATION(BAZETT): 433 MS
QTC FREDERICIA: 425 MS
R AXIS: 57 DEGREES
T AXIS: 73 DEGREES
T OFFSET: 427 MS
VENTRICULAR RATE: 68 BPM

## 2024-09-26 PROCEDURE — 1157F ADVNC CARE PLAN IN RCRD: CPT | Performed by: INTERNAL MEDICINE

## 2024-09-26 PROCEDURE — 1036F TOBACCO NON-USER: CPT | Performed by: INTERNAL MEDICINE

## 2024-09-26 PROCEDURE — 93005 ELECTROCARDIOGRAM TRACING: CPT | Performed by: INTERNAL MEDICINE

## 2024-09-26 PROCEDURE — 3078F DIAST BP <80 MM HG: CPT | Performed by: INTERNAL MEDICINE

## 2024-09-26 PROCEDURE — 99214 OFFICE O/P EST MOD 30 MIN: CPT | Performed by: INTERNAL MEDICINE

## 2024-09-26 PROCEDURE — 3077F SYST BP >= 140 MM HG: CPT | Performed by: INTERNAL MEDICINE

## 2024-09-26 PROCEDURE — 1159F MED LIST DOCD IN RCRD: CPT | Performed by: INTERNAL MEDICINE

## 2024-09-26 PROCEDURE — G2211 COMPLEX E/M VISIT ADD ON: HCPCS | Performed by: INTERNAL MEDICINE

## 2024-09-26 RX ORDER — LISINOPRIL 20 MG/1
20 TABLET ORAL DAILY
Qty: 90 TABLET | Refills: 3 | Status: SHIPPED | OUTPATIENT
Start: 2024-09-26

## 2024-09-26 NOTE — PROGRESS NOTES
Chief Complaint:   No chief complaint on file.     History Of Present Illness:    Joan Brown is a 83 y.o. female presenting for follow-up.  Doing well clinically--denies any chest pain, shortness of breath, dizziness, palpitations.  Compliant with medications.  We stopped her metoprolol at last visit, states she thinks her blood pressure has been higher since then.     Last Recorded Vitals:  Vitals:    09/26/24 1121   BP: 173/71   Pulse: 76   SpO2: 97%   Weight: 56.9 kg (125 lb 7.1 oz)       Past Medical History:  She has a past medical history of Abnormal levels of other serum enzymes (03/18/2020), Cellulitis of left lower limb (01/08/2018), Hyperlipidemia, unspecified (10/04/2016), Non-Hodgkin lymphoma, unspecified, lymph nodes of multiple sites (Multi) (04/25/2016), Personal history of other diseases of the respiratory system (04/25/2016), Personal history of other specified conditions (03/18/2020), Personal history of other specified conditions (04/25/2016), Personal history of pneumonia (recurrent) (01/06/2020), Strain of muscle, fascia and tendon of lower back, initial encounter (04/25/2016), and Urinary tract infection, site not specified (11/18/2019).    Past Surgical History:  She has a past surgical history that includes Hemicolectomy (04/25/2016); Cholecystectomy (04/25/2016); Hysterectomy (04/25/2016); and Breast biopsy (Left, 04/25/2016).      Social History:  She reports that she has never smoked. She has never been exposed to tobacco smoke. She has never used smokeless tobacco. She reports current alcohol use of about 1.0 standard drink of alcohol per week. She reports that she does not use drugs.    Family History:  Family History   Problem Relation Name Age of Onset    Breast cancer Sister  39        Allergies:  Quinidine    Outpatient Medications:  Current Outpatient Medications   Medication Instructions    aspirin 81 mg EC tablet 1 tablet, oral, Daily    calcium citrate-vitamin D3  (Citracal+D) 315 mg-5 mcg (200 unit) tablet 2 tablets, oral, Daily    cholecalciferol (Vitamin D-3) 25 MCG (1000 UT) tablet 1 tablet, oral, Daily    denosumab (Prolia) 60 mg/mL syringe Provider to inject 1 mL (60 mg total) under the skin every 6 months.    hydrocortisone 2.5 % ointment Topical, 2 times daily PRN    lisinopril 10 mg, oral, Daily    potassium &magnesium aspartate (magnesium, potassium aspartate) 250-250 mg capsule oral       Physical Exam:  Constitutional:       Appearance: Healthy appearance. Not in distress.   Neck:      Vascular: No JVR. JVD normal.   Pulmonary:      Effort: Pulmonary effort is normal.      Breath sounds: Normal breath sounds. No wheezing. No rhonchi. No rales.   Chest:      Chest wall: Not tender to palpatation.   Cardiovascular:      Normal rate. Regular rhythm.      Murmurs: There is no murmur.   Edema:     Peripheral edema absent.   Abdominal:      General: Bowel sounds are normal.      Palpations: Abdomen is soft.      Tenderness: There is no abdominal tenderness.   Musculoskeletal: Normal range of motion. Skin:     General: Skin is warm and dry.   Neurological:      General: No focal deficit present.      Mental Status: Alert and oriented to person, place and time.           Last Labs:  CBC -  Lab Results   Component Value Date    WBC 5.5 10/16/2023    HGB 13.0 10/16/2023    HCT 40.8 10/16/2023    MCV 95 10/16/2023     10/16/2023       CMP -  Lab Results   Component Value Date    CALCIUM 9.3 10/16/2023    PROT 6.7 10/16/2023    ALBUMIN 4.2 10/16/2023    AST 22 10/16/2023    ALT 14 10/16/2023    ALKPHOS 57 10/16/2023    BILITOT 0.6 10/16/2023       LIPID PANEL -   Lab Results   Component Value Date    CHOL 265 (H) 10/16/2023    TRIG 72 10/16/2023    HDL 83.2 10/16/2023    CHHDL 3.2 10/16/2023    LDLF 154 (H) 10/03/2022    VLDL 14 10/16/2023    NHDL 182 (H) 10/16/2023       RENAL FUNCTION PANEL -   Lab Results   Component Value Date    GLUCOSE 83 10/16/2023      10/16/2023    K 4.5 10/16/2023    CL 99 10/16/2023    CO2 28 10/16/2023    ANIONGAP 14 10/16/2023    BUN 13 10/16/2023    CREATININE 0.80 10/16/2023    CALCIUM 9.3 10/16/2023    ALBUMIN 4.2 10/16/2023        Lab Results   Component Value Date    HGBA1C 5.7 (A) 10/03/2022       Last Cardiology Tests:  ECG independently reviewed from today: sinus rhythm, rate 68, no ST or T wave abnormalities     Echo 4/2022:  1. The left ventricular systolic function is normal with a 60-65% estimated ejection fraction.  2. Spectral Doppler shows an impaired relaxation pattern of left ventricular diastolic filling.  3. There is a transcatheter aortic valve replacement. Echo findings are consistent with normal aortic valve prosthesis structure and function  4. There is mild mitral and tricuspid regurgitation.     Echo 3/25/2021:  1. The left ventricular systolic function is normal with a 55-60% estimated  ejection fraction.  2. Spectral Doppler shows an impaired relaxation pattern of left ventricular  diastolic filling.  3. There is a transcatheter aortic valve replacement.  4. There is mild mitral and tricuspid regurgitation.     Cath 12/29/2020:  1. Mild nonobstructive coronary disease in right dominant coronary circulation.  2. Normal right and left heart filling pressures.     Assessment/Plan   Very pleasant 83 year old female with HTN, HLD and aortic stenosis s/p TAVR on 2/15/2021.  Overall doing very well clinically, blood pressure needs better controlled however.     Plan:  -continue ASA  -Increase lisinopril to 20 mg daily  -f/u in one year or sooner if needed      Chad Jones MD

## 2024-10-14 ENCOUNTER — APPOINTMENT (OUTPATIENT)
Dept: DERMATOLOGY | Facility: CLINIC | Age: 84
End: 2024-10-14
Payer: MEDICARE

## 2024-10-14 DIAGNOSIS — L90.5 SCAR CONDITIONS AND FIBROSIS OF SKIN: ICD-10-CM

## 2024-10-14 DIAGNOSIS — D48.5 NEOPLASM OF UNCERTAIN BEHAVIOR OF SKIN: ICD-10-CM

## 2024-10-14 DIAGNOSIS — D18.01 ANGIOMA OF SKIN: Primary | ICD-10-CM

## 2024-10-14 DIAGNOSIS — L81.4 LENTIGO: ICD-10-CM

## 2024-10-14 DIAGNOSIS — D22.9 BENIGN NEVUS: ICD-10-CM

## 2024-10-14 DIAGNOSIS — C44.91 BASAL CELL CARCINOMA (BCC), UNSPECIFIED SITE: ICD-10-CM

## 2024-10-14 DIAGNOSIS — L82.1 SEBORRHEIC KERATOSIS: ICD-10-CM

## 2024-10-14 DIAGNOSIS — L57.9 SKIN CHANGES DUE TO CHRONIC EXPOSURE TO NONIONIZING RADIATION: ICD-10-CM

## 2024-10-14 PROCEDURE — 1159F MED LIST DOCD IN RCRD: CPT | Performed by: NURSE PRACTITIONER

## 2024-10-14 PROCEDURE — 11102 TANGNTL BX SKIN SINGLE LES: CPT | Performed by: NURSE PRACTITIONER

## 2024-10-14 PROCEDURE — 99213 OFFICE O/P EST LOW 20 MIN: CPT | Performed by: NURSE PRACTITIONER

## 2024-10-14 PROCEDURE — 11103 TANGNTL BX SKIN EA SEP/ADDL: CPT | Performed by: NURSE PRACTITIONER

## 2024-10-14 PROCEDURE — 1036F TOBACCO NON-USER: CPT | Performed by: NURSE PRACTITIONER

## 2024-10-14 PROCEDURE — 1157F ADVNC CARE PLAN IN RCRD: CPT | Performed by: NURSE PRACTITIONER

## 2024-10-14 PROCEDURE — 1160F RVW MEDS BY RX/DR IN RCRD: CPT | Performed by: NURSE PRACTITIONER

## 2024-10-14 NOTE — PATIENT INSTRUCTIONS

## 2024-10-14 NOTE — PROGRESS NOTES
Subjective     Joan Brown is a 83 y.o. female who presents for the following: Skin Check.     Review of Systems:  No other skin or systemic complaints other than what is documented elsewhere in the note.    The following portions of the chart were reviewed this encounter and updated as appropriate:  Tobacco  Allergies  Meds  Problems  Med Hx  Surg Hx       Skin Cancer History  Biopsy Date Type Location Status   7/31/24 BCC Mid Tip of Nose Refer Mohs/Surgeon  8/27/24 7/31/24 SCC Left Lower Leg - Anterior Refer Mohs/Surgeon  8/27/24     Specialty Problems          Dermatology Problems    BCC (basal cell carcinoma of skin)     5/20/24 S/p mohs on back.  (APEX) Bx 4/25/24.     7/19/24 rapid growing BCC vs SCC with large actinic plug --> Dr Mckeon in [place].          Actinic keratoses    Dyshidrosis (pompholyx)    Melanocytic nevi of trunk    Onycholysis    Other specified follicular disorders    Personal history of other malignant neoplasm of skin    Malignant melanoma of right forearm (Multi)     treated by Derm ([facility] in [facility])  in 2019          Past Medical History:  Joan Brown  has a past medical history of Abnormal levels of other serum enzymes (03/18/2020), Cellulitis of left lower limb (01/08/2018), Hyperlipidemia, unspecified (10/04/2016), Non-Hodgkin lymphoma, unspecified, lymph nodes of multiple sites (Multi) (04/25/2016), Personal history of other diseases of the respiratory system (04/25/2016), Personal history of other specified conditions (03/18/2020), Personal history of other specified conditions (04/25/2016), Personal history of pneumonia (recurrent) (01/06/2020), Strain of muscle, fascia and tendon of lower back, initial encounter (04/25/2016), and Urinary tract infection, site not specified (11/18/2019).    Past Surgical History:  Joan Brown  has a past surgical history that includes Hemicolectomy (04/25/2016); Cholecystectomy (04/25/2016); Hysterectomy  (04/25/2016); and Breast biopsy (Left, 04/25/2016).    Family History:  Patient family history includes Breast cancer (age of onset: 39) in her sister.    Social History:  Joan Brown  reports that she has never smoked. She has never been exposed to tobacco smoke. She has never used smokeless tobacco. She reports current alcohol use of about 1.0 standard drink of alcohol per week. She reports that she does not use drugs.    Allergies:  Quinidine    Current Medications / CAM's:    Current Outpatient Medications:     aspirin 81 mg EC tablet, Take 1 tablet (81 mg) by mouth once daily., Disp: , Rfl:     calcium citrate-vitamin D3 (Citracal+D) 315 mg-5 mcg (200 unit) tablet, Take 2 tablets by mouth once daily., Disp: , Rfl:     cholecalciferol (Vitamin D-3) 25 MCG (1000 UT) tablet, Take 1 tablet (25 mcg) by mouth once daily., Disp: , Rfl:     denosumab (Prolia) 60 mg/mL syringe, Provider to inject 1 mL (60 mg total) under the skin every 6 months., Disp: 1 mL, Rfl: 1    hydrocortisone 2.5 % ointment, Apply topically 2 times a day as needed for irritation or rash. (Patient not taking: Reported on 9/26/2024), Disp: 30 g, Rfl: 2    lisinopril 20 mg tablet, Take 1 tablet (20 mg) by mouth once daily., Disp: 90 tablet, Rfl: 3    potassium &magnesium aspartate (magnesium, potassium aspartate) 250-250 mg capsule, Take by mouth., Disp: , Rfl:      Objective   Well appearing patient in no apparent distress; mood and affect are within normal limits.    A full examination was performed including scalp, head, eyes, ears, nose, lips, neck, chest, axillae, abdomen, back, buttocks, bilateral upper extremities, bilateral lower extremities, hands, feet, fingers, toes, fingernails, and toenails. All findings within normal limits unless otherwise noted below.    Lymph Nodes  The following lymph node beds were examined: Preauricular. Postauricular. Submandib. Submental. Occipital. Cervical. Supraclav. Axillary. Epitroch. Inguinal.  Popliteal.    The lymph node beds were examined bilaterally and no palpable adenopathy was noted.    Assessment/Plan   1. Angioma of skin  Scattered cherry-red papule(s).    A cherry hemangioma is a small macule (small, flat, smooth area) or papule (small, solid bump) formed from an overgrowth of tiny blood vessels in the skin. Cherry hemangiomas are characteristically red or purplish in color. They often first appear in middle adulthood and usually increase in number with age. Cherry hemangiomas are noncancerous (benign) and are common in adults.    The present appearance of the lesion is not worrisome but it should continue to be observed and testing/treatment may be warranted if change occurs.    2. Neoplasm of uncertain behavior of skin (4)  Right Upper Back  5.5 mm red thin papule with arborizing telangiectasias.           Lesion biopsy  Type of biopsy: tangential    Informed consent: discussed and consent obtained    Timeout: patient name, date of birth, surgical site, and procedure verified    Procedure prep:  Patient was prepped and draped  Anesthesia: the lesion was anesthetized in a standard fashion    Anesthetic:  1% lidocaine plain local infiltration  Instrument used: DermaBlade    Hemostasis achieved with: electrodesiccation    Outcome: patient tolerated procedure well    Post-procedure details: wound care instructions given    Additional details:  Cleaned area with isopropyl alcohol prior to anesthesia or biopsy. Applied thin layer of vaseline and covered with bandaid after procedure      Staff Communication: Dermatology Local Anesthesia: 1 % Plain Lidocaine - Amount: 0.5 ml per lesion.    Specimen 1 - Dermatopathology- DERM LAB  Differential Diagnosis: NMSC  Check Margins Yes/No?:    Comments:    Dermpath Lab: Routine Histopathology (formalin-fixed tissue)    Left Upper Back  4.5 x 5.5 mm red pink papule with arborizing telangiectasias and scale          Lesion biopsy  Type of biopsy: tangential     Informed consent: discussed and consent obtained    Timeout: patient name, date of birth, surgical site, and procedure verified    Procedure prep:  Patient was prepped and draped  Anesthesia: the lesion was anesthetized in a standard fashion    Anesthetic:  1% lidocaine plain local infiltration  Instrument used: DermaBlade    Hemostasis achieved with: electrodesiccation    Outcome: patient tolerated procedure well    Post-procedure details: wound care instructions given    Additional details:  Cleaned area with isopropyl alcohol prior to anesthesia or biopsy. Applied thin layer of vaseline and covered with bandaid after procedure      Staff Communication: Dermatology Local Anesthesia: 1 % Plain Lidocaine - Amount: 0.5 ml per lesion.    Specimen 2 - Dermatopathology- DERM LAB  Differential Diagnosis: NMSC  Check Margins Yes/No?:    Comments:    Dermpath Lab: Routine Histopathology (formalin-fixed tissue)    Right Upper Arm - Posterior  18 x 14 mm tan dark brown patch with pigment drop out erythema and black dots.           Lesion biopsy  Type of biopsy: tangential    Informed consent: discussed and consent obtained    Timeout: patient name, date of birth, surgical site, and procedure verified    Procedure prep:  Patient was prepped and draped  Anesthesia: the lesion was anesthetized in a standard fashion    Anesthetic:  1% lidocaine plain local infiltration  Instrument used: DermaBlade    Hemostasis achieved with: electrodesiccation    Outcome: patient tolerated procedure well    Post-procedure details: wound care instructions given    Additional details:  Cleaned area with isopropyl alcohol prior to anesthesia or biopsy. Applied thin layer of vaseline and covered with bandaid after procedure      Staff Communication: Dermatology Local Anesthesia: 1 % Plain Lidocaine - Amount: 0.5 ml per lesion.    Specimen 3 - Dermatopathology- DERM LAB  Differential Diagnosis: MM vs DN  Check Margins Yes/No?:    Comments:     Dermpath Lab: Routine Histopathology (formalin-fixed tissue)    Right Upper Arm - Posterior Superior  4 mm pearly papule          Lesion biopsy  Type of biopsy: tangential    Informed consent: discussed and consent obtained    Timeout: patient name, date of birth, surgical site, and procedure verified    Procedure prep:  Patient was prepped and draped  Anesthesia: the lesion was anesthetized in a standard fashion    Anesthetic:  1% lidocaine plain local infiltration  Instrument used: DermaBlade    Hemostasis achieved with: electrodesiccation    Outcome: patient tolerated procedure well    Post-procedure details: wound care instructions given    Additional details:  Cleaned area with isopropyl alcohol prior to anesthesia or biopsy. Applied thin layer of vaseline and covered with bandaid after procedure      Staff Communication: Dermatology Local Anesthesia: 1 % Plain Lidocaine - Amount: 0.5 ml per lesion.    Specimen 4 - Dermatopathology- DERM LAB  Differential Diagnosis: NMSC  Check Margins Yes/No?:    Comments:    Dermpath Lab: Routine Histopathology (formalin-fixed tissue)    NUB  - Given uncertainty in clinical diagnosis, shave biopsy is recommended in clinic today.  - The patient expressed understanding, is in agreement with this plan, and wishes to proceed with biopsy.  - Oral and written wound care instructions provided.  - Advised patient that the office will call within 2 weeks to discuss biopsy results.      Related Procedures  Follow Up In Dermatology - Established Patient    3. Seborrheic keratosis  Stuck on verrucous, tan-brown papules and plaques.      Seborrheic keratoses are common noncancerous (benign) growths of unknown cause seen in adults due to a thickening of an area of the top skin layer. Seborrheic keratoses may appear as if they are stuck on to the skin. They have distinct borders, and they may appear as papules (small, solid bumps) or plaques (solid, raised patches that are bigger than a  thumbnail). They may be the same color as your skin, or they may be pink, light brown, darker brown, or very dark brown, or sometimes may appear black.    There is no way to prevent new seborrheic keratoses from forming. Seborrheic keratoses can be removed, but removal is considered a cosmetic issue and is usually not covered by insurance.    PLAN  No treatment is needed unless there is irritation from clothing, such as itching or bleeding.  2.   Some lotions containing alpha hydroxy acids, salicylic acid, or urea may make the areas feel smoother with regular use but will not eliminate them.    Related Procedures  Follow Up In Dermatology - Established Patient    4. Skin changes due to chronic exposure to nonionizing radiation    Related Procedures  Follow Up In Dermatology - Established Patient    5. Benign nevus  Scattered, uniform and benign-appearing, regular brown melanocytic papules and macules.    The present appearance of the lesion is not worrisome but it should continue to be observed and testing/treatment may be warranted if change occurs.    6. Lentigo  Scattered tan macules in sun-exposed areas.    A solar lentigo (plural, solar lentigines), also known as a sun-induced freckle or senile lentigo, is a dark (hyperpigmented) lesion caused by natural or artificial ultraviolet (UV) light. Solar lentigines may be single or multiple. This type of lentigo is different from a simple lentigo (lentigo simplex) because it is caused by exposure to UV light. Solar lentigines are benign, but they do indicate excessive sun exposure, a risk factor for the development of skin cancer.    To prevent solar lentigines, avoid exposure to sunlight in midday (10 AM to 3 PM), wear sun-protective clothing (tightly woven clothes and hats), and apply sunscreen (SPF 30 UVA and UVB block).    The present appearance of the lesion is not worrisome but it should continue to be observed and testing/treatment may be warranted if change  occurs.    7. Scar conditions and fibrosis of skin (4)  Left Lower Leg - Anterior, Nose, Right Forearm - Posterior, Right Upper Back  Well healed scar at the site(s) of prior treatment with no evidence of recurrence.          The scar is clear, there is no evidence of recurrence.  The present appearance of the scar is not worrisome but it should continue to be observed and testing/treatment may be warranted if change occurs.             Patient has history of malignant melanoma. Follow up in 6 months for long-term monitoring for skin cancer recurrence and metastasis or sooner if needed. May adjust to 3 months if new MM identified.

## 2024-10-17 ENCOUNTER — TELEPHONE (OUTPATIENT)
Dept: DERMATOLOGY | Facility: CLINIC | Age: 84
End: 2024-10-17
Payer: MEDICARE

## 2024-10-17 NOTE — TELEPHONE ENCOUNTER
A - Skin cancer - referred to derm surgery for treatment.     B - Skin cancer-  referred to derm surgery for treatment.     C - Pre skin cancer. Please have Dr. Sheppard treat with LN2 when she comes in for surgery.     D - Skin cancer - referred to derm surgery for treatment.

## 2024-10-25 ENCOUNTER — TELEPHONE (OUTPATIENT)
Dept: PRIMARY CARE | Facility: CLINIC | Age: 84
End: 2024-10-25
Payer: MEDICARE

## 2024-10-25 DIAGNOSIS — M81.0 AGE RELATED OSTEOPOROSIS, UNSPECIFIED PATHOLOGICAL FRACTURE PRESENCE: ICD-10-CM

## 2024-10-25 RX ORDER — DENOSUMAB 60 MG/ML
INJECTION SUBCUTANEOUS
Qty: 1 ML | Refills: 1 | Status: SHIPPED | OUTPATIENT
Start: 2024-10-25 | End: 2025-10-25

## 2024-11-01 ENCOUNTER — LAB (OUTPATIENT)
Dept: LAB | Facility: LAB | Age: 84
End: 2024-11-01
Payer: MEDICARE

## 2024-11-01 DIAGNOSIS — I10 HYPERTENSION, UNSPECIFIED TYPE: ICD-10-CM

## 2024-11-01 DIAGNOSIS — E55.9 VITAMIN D DEFICIENCY: ICD-10-CM

## 2024-11-01 DIAGNOSIS — R73.03 PREDIABETES: ICD-10-CM

## 2024-11-01 DIAGNOSIS — E78.5 DYSLIPIDEMIA: ICD-10-CM

## 2024-11-01 LAB
25(OH)D3 SERPL-MCNC: 78 NG/ML (ref 30–100)
ALBUMIN SERPL BCP-MCNC: 4 G/DL (ref 3.4–5)
ALP SERPL-CCNC: 45 U/L (ref 33–136)
ALT SERPL W P-5'-P-CCNC: 14 U/L (ref 7–45)
ANION GAP SERPL CALC-SCNC: 12 MMOL/L (ref 10–20)
AST SERPL W P-5'-P-CCNC: 21 U/L (ref 9–39)
BILIRUB SERPL-MCNC: 0.6 MG/DL (ref 0–1.2)
BUN SERPL-MCNC: 13 MG/DL (ref 6–23)
CALCIUM SERPL-MCNC: 9.1 MG/DL (ref 8.6–10.3)
CHLORIDE SERPL-SCNC: 99 MMOL/L (ref 98–107)
CHOLEST SERPL-MCNC: 249 MG/DL (ref 0–199)
CHOLESTEROL/HDL RATIO: 2.9
CO2 SERPL-SCNC: 30 MMOL/L (ref 21–32)
CREAT SERPL-MCNC: 0.83 MG/DL (ref 0.5–1.05)
EGFRCR SERPLBLD CKD-EPI 2021: 70 ML/MIN/1.73M*2
ERYTHROCYTE [DISTWIDTH] IN BLOOD BY AUTOMATED COUNT: 12.9 % (ref 11.5–14.5)
EST. AVERAGE GLUCOSE BLD GHB EST-MCNC: 120 MG/DL
GLUCOSE SERPL-MCNC: 82 MG/DL (ref 74–99)
HBA1C MFR BLD: 5.8 %
HCT VFR BLD AUTO: 39.7 % (ref 36–46)
HDLC SERPL-MCNC: 87.3 MG/DL
HGB BLD-MCNC: 12.6 G/DL (ref 12–16)
LDLC SERPL CALC-MCNC: 148 MG/DL
MCH RBC QN AUTO: 29.9 PG (ref 26–34)
MCHC RBC AUTO-ENTMCNC: 31.7 G/DL (ref 32–36)
MCV RBC AUTO: 94 FL (ref 80–100)
NON HDL CHOLESTEROL: 162 MG/DL (ref 0–149)
NRBC BLD-RTO: 0 /100 WBCS (ref 0–0)
PLATELET # BLD AUTO: 255 X10*3/UL (ref 150–450)
POTASSIUM SERPL-SCNC: 4.4 MMOL/L (ref 3.5–5.3)
PROT SERPL-MCNC: 6.5 G/DL (ref 6.4–8.2)
RBC # BLD AUTO: 4.21 X10*6/UL (ref 4–5.2)
SODIUM SERPL-SCNC: 137 MMOL/L (ref 136–145)
TRIGL SERPL-MCNC: 68 MG/DL (ref 0–149)
VLDL: 14 MG/DL (ref 0–40)
WBC # BLD AUTO: 5.5 X10*3/UL (ref 4.4–11.3)

## 2024-11-01 PROCEDURE — 80053 COMPREHEN METABOLIC PANEL: CPT

## 2024-11-01 PROCEDURE — 85027 COMPLETE CBC AUTOMATED: CPT

## 2024-11-01 PROCEDURE — 36415 COLL VENOUS BLD VENIPUNCTURE: CPT

## 2024-11-01 PROCEDURE — 82306 VITAMIN D 25 HYDROXY: CPT

## 2024-11-01 PROCEDURE — 83036 HEMOGLOBIN GLYCOSYLATED A1C: CPT

## 2024-11-01 PROCEDURE — 80061 LIPID PANEL: CPT

## 2024-11-08 ENCOUNTER — APPOINTMENT (OUTPATIENT)
Dept: PRIMARY CARE | Facility: CLINIC | Age: 84
End: 2024-11-08
Payer: MEDICARE

## 2024-11-08 VITALS
HEART RATE: 64 BPM | HEIGHT: 61 IN | TEMPERATURE: 97.5 F | WEIGHT: 125.8 LBS | SYSTOLIC BLOOD PRESSURE: 118 MMHG | OXYGEN SATURATION: 95 % | BODY MASS INDEX: 23.75 KG/M2 | DIASTOLIC BLOOD PRESSURE: 80 MMHG

## 2024-11-08 DIAGNOSIS — Z00.00 MEDICARE ANNUAL WELLNESS VISIT, SUBSEQUENT: ICD-10-CM

## 2024-11-08 DIAGNOSIS — I35.0 NONRHEUMATIC AORTIC VALVE STENOSIS: Primary | ICD-10-CM

## 2024-11-08 DIAGNOSIS — R73.03 PREDIABETES: ICD-10-CM

## 2024-11-08 DIAGNOSIS — Z12.31 ENCOUNTER FOR SCREENING MAMMOGRAM FOR MALIGNANT NEOPLASM OF BREAST: ICD-10-CM

## 2024-11-08 DIAGNOSIS — C44.91 BASAL CELL CARCINOMA (BCC), UNSPECIFIED SITE: ICD-10-CM

## 2024-11-08 DIAGNOSIS — I10 PRIMARY HYPERTENSION: ICD-10-CM

## 2024-11-08 DIAGNOSIS — M81.0 OSTEOPOROSIS, POST-MENOPAUSAL: ICD-10-CM

## 2024-11-08 DIAGNOSIS — Z78.0 MENOPAUSE: ICD-10-CM

## 2024-11-08 DIAGNOSIS — E78.5 DYSLIPIDEMIA: ICD-10-CM

## 2024-11-08 PROBLEM — Z85.828 PERSONAL HISTORY OF OTHER MALIGNANT NEOPLASM OF SKIN: Status: RESOLVED | Noted: 2019-08-22 | Resolved: 2024-11-08

## 2024-11-08 PROBLEM — S01.81XA FACE LACERATIONS: Status: RESOLVED | Noted: 2024-04-12 | Resolved: 2024-11-08

## 2024-11-08 PROBLEM — D49.2 NEOPLASM OF UNSPECIFIED BEHAVIOR OF BONE, SOFT TISSUE, AND SKIN: Status: RESOLVED | Noted: 2019-08-22 | Resolved: 2024-11-08

## 2024-11-08 PROCEDURE — 3074F SYST BP LT 130 MM HG: CPT | Performed by: FAMILY MEDICINE

## 2024-11-08 PROCEDURE — G0439 PPPS, SUBSEQ VISIT: HCPCS | Performed by: FAMILY MEDICINE

## 2024-11-08 PROCEDURE — G0446 INTENS BEHAVE THER CARDIO DX: HCPCS | Performed by: FAMILY MEDICINE

## 2024-11-08 PROCEDURE — 1157F ADVNC CARE PLAN IN RCRD: CPT | Performed by: FAMILY MEDICINE

## 2024-11-08 PROCEDURE — 1170F FXNL STATUS ASSESSED: CPT | Performed by: FAMILY MEDICINE

## 2024-11-08 PROCEDURE — 1159F MED LIST DOCD IN RCRD: CPT | Performed by: FAMILY MEDICINE

## 2024-11-08 PROCEDURE — 1124F ACP DISCUSS-NO DSCNMKR DOCD: CPT | Performed by: FAMILY MEDICINE

## 2024-11-08 PROCEDURE — 3079F DIAST BP 80-89 MM HG: CPT | Performed by: FAMILY MEDICINE

## 2024-11-08 PROCEDURE — 99214 OFFICE O/P EST MOD 30 MIN: CPT | Performed by: FAMILY MEDICINE

## 2024-11-08 PROCEDURE — 1160F RVW MEDS BY RX/DR IN RCRD: CPT | Performed by: FAMILY MEDICINE

## 2024-11-08 PROCEDURE — 1036F TOBACCO NON-USER: CPT | Performed by: FAMILY MEDICINE

## 2024-11-08 PROCEDURE — 99397 PER PM REEVAL EST PAT 65+ YR: CPT | Performed by: FAMILY MEDICINE

## 2024-11-08 ASSESSMENT — ENCOUNTER SYMPTOMS
HALLUCINATIONS: 0
RHINORRHEA: 0
HEADACHES: 0
NECK STIFFNESS: 0
BRUISES/BLEEDS EASILY: 0
EYE REDNESS: 0
COUGH: 0
WOUND: 0
BLOOD IN STOOL: 0
SORE THROAT: 0
ABDOMINAL PAIN: 0
POLYDIPSIA: 0
BACK PAIN: 0
HEMATURIA: 0
ADENOPATHY: 0
EYE PAIN: 0
CHILLS: 0
SHORTNESS OF BREATH: 0
PALPITATIONS: 0
DYSURIA: 0
FATIGUE: 0
WEAKNESS: 0
FEVER: 0

## 2024-11-08 ASSESSMENT — PATIENT HEALTH QUESTIONNAIRE - PHQ9
SUM OF ALL RESPONSES TO PHQ9 QUESTIONS 1 AND 2: 0
2. FEELING DOWN, DEPRESSED OR HOPELESS: NOT AT ALL
1. LITTLE INTEREST OR PLEASURE IN DOING THINGS: NOT AT ALL

## 2024-11-08 ASSESSMENT — ACTIVITIES OF DAILY LIVING (ADL)
DOING_HOUSEWORK: INDEPENDENT
MANAGING_FINANCES: INDEPENDENT
GROCERY_SHOPPING: INDEPENDENT
BATHING: INDEPENDENT
TAKING_MEDICATION: INDEPENDENT
DRESSING: INDEPENDENT

## 2024-11-08 NOTE — ASSESSMENT & PLAN NOTE
High risk from CACS score -- did not tolerate aspirin and declines statin.     Protective HDL level.

## 2024-11-08 NOTE — PROGRESS NOTES
"Subjective   Reason for Visit: Joan Brown is an 83 y.o. female here for a Medicare Wellness visit.   Watson preventive exam. And annual recheck on chronic stable HTN, HLD and prediabets.     Recent cold sores -- resolving.     Past Medical, Surgical, and Family History reviewed and updated in chart.    Reviewed all medications by prescribing practitioner or clinical pharmacist (such as prescriptions, OTCs, herbal therapies and supplements) and documented in the medical record.        Patient Care Team:  Dionisio Abreu MD as PCP - General (Family Medicine)  Dionisio Abreu MD as PCP - [employer] PCP     Review of Systems   Constitutional:  Negative for chills, fatigue and fever.   HENT:  Negative for rhinorrhea and sore throat.    Eyes:  Negative for pain and redness.   Respiratory:  Negative for cough and shortness of breath.    Cardiovascular:  Negative for chest pain and palpitations.   Gastrointestinal:  Negative for abdominal pain and blood in stool.   Endocrine: Negative for polydipsia and polyuria.   Genitourinary:  Negative for dysuria and hematuria.   Musculoskeletal:  Negative for back pain and neck stiffness.   Skin:  Positive for rash. Negative for wound.   Allergic/Immunologic: Negative for environmental allergies and food allergies.   Neurological:  Negative for weakness and headaches.   Hematological:  Negative for adenopathy. Does not bruise/bleed easily.   Psychiatric/Behavioral:  Negative for hallucinations and suicidal ideas.        Objective   Vitals:  /80   Pulse 64   Temp 36.4 °C (97.5 °F)   Ht 1.551 m (5' 1.08\")   Wt 57.1 kg (125 lb 12.8 oz)   SpO2 95%   BMI 23.71 kg/m²       Physical Exam  Vitals reviewed.   Constitutional:       General: She is not in acute distress.     Appearance: She is not ill-appearing.   HENT:      Head: Normocephalic and atraumatic.      Right Ear: Tympanic membrane normal.      Left Ear: Tympanic membrane normal.      Nose: No " congestion or rhinorrhea.      Mouth/Throat:      Pharynx: No oropharyngeal exudate or posterior oropharyngeal erythema.   Eyes:      Extraocular Movements: Extraocular movements intact.      Conjunctiva/sclera: Conjunctivae normal.      Pupils: Pupils are equal, round, and reactive to light.   Cardiovascular:      Rate and Rhythm: Normal rate and regular rhythm.      Heart sounds: No murmur heard.     No friction rub. No gallop.   Pulmonary:      Effort: Pulmonary effort is normal.      Breath sounds: Normal breath sounds. No wheezing, rhonchi or rales.   Abdominal:      General: There is no distension.      Palpations: Abdomen is soft.      Tenderness: There is no abdominal tenderness. There is no guarding or rebound.   Musculoskeletal:         General: No swelling or deformity.      Cervical back: Normal range of motion and neck supple.      Right lower leg: No edema.      Left lower leg: No edema.   Skin:     Capillary Refill: Capillary refill takes less than 2 seconds.      Coloration: Skin is not jaundiced.      Findings: No rash.   Neurological:      General: No focal deficit present.      Mental Status: She is alert.      Motor: No weakness.   Psychiatric:         Mood and Affect: Mood normal.         Behavior: Behavior normal.       Healing cold sore lower lip.     Assessment/Plan   Problem List Items Addressed This Visit       Aortic stenosis - Primary    Overview     2/15/2021 s/p TAVR      9/26/24 CARDIO (Robert) stable stopped metoprolol , doubled lisionopril to 20 mg daily.           Current Assessment & Plan     Well controlled on Lisinopril 20 mg daily.  Continue this.          Hypertension    Current Assessment & Plan     Well controlled on lisionprio 20 mg daily.          Relevant Orders    Comprehensive metabolic panel    CBC    Lipid panel    Osteoporosis, post-menopausal    Overview     7/2019 DEXA T -2.6  7/2021 DEXA T -2.7  8/2023 DEXA T -2.9     Start prolia q6 months. 10/2023    Was able to  get 11/23, 5/2024 but has not received dose yet from [facility] yet.          Current Assessment & Plan     It is still medically necessary for Joan to receive every 6 month injections of Prolia.  Next DEXA scan not due until 8/2025.         Prediabetes    Relevant Orders    Hemoglobin A1c    Medicare annual wellness visit, subsequent    Overview     11/8/24  Medicare wellness subsequent eval performed  11/8/24 Wellness/cpe covered by anth performed         Current Assessment & Plan     11/8/24 Wellness/cpe covered by anth -- annual mammo. every 2 year dexa has been stable. Screening colonoscopy -- no further screening required after age 75.      (Bill 9/2020) -- had services in 2023 d/t covid.     UTD per patient on pneumonia and tetanus, zostavax, vaccines. Prevnar 4/11/17.     Pain level 0/10.      11/8/24  Medicare wellness subsequent eval -- no falls, no dementia, no depression, vaccines utd, no loss of adls. No alcohol. no tobacco. no opioids. No hospitalization.      11/8/24  I spent 15 minutes face-to-face with this individual discussing their cardiovascular risk and behavioral therapies of nutritional choices, exercise, and elimination of habits contributing to risk. We agreed on a plan addressing reduction of their current cardiovascular risk. Patient's 10 year CV risk estimate calculates to: 3.2 %      Encourage RSV vaccine x 1.  Annual covid and flu UTD.     # Left great toe 1st PIP valgus deformity -- to see DPM Dr Perez.      # Muscle cramping -- potassium on low side -- encourage OTC Magnesium/Potassium supplement at bedtime.     # Left foot plantar wart -- treated per podiatry Dr Perez.     # Right eye skin tag -- tiny not bothering her -- Dr Sorto did not think it needed to remove.   # Hx BPPV -- stable now.   # Hx of lymphoma -- follow up with Dr Law in May. Completed chemo 2012.    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   # hx  Right ring finger palmar tendon contracture -- referred to hand surgeon.          Dyslipidemia    Current Assessment & Plan     High risk from CACS score -- did not tolerate aspirin and declines statin.     Protective HDL level.          BCC (basal cell carcinoma of skin)    Overview     5/20/24 S/p mohs on back.  (APEX) Bx 4/25/24.     8/27/24 s/p mohs on leg, nose (Sheppard)         Current Assessment & Plan     Remaining suture removed with tweezers from nose.           Other Visit Diagnoses       Menopause        Relevant Orders    XR DEXA bone density    Encounter for screening mammogram for malignant neoplasm of breast        Relevant Orders    BI mammo bilateral screening tomosynthesis

## 2024-11-08 NOTE — ASSESSMENT & PLAN NOTE
It is still medically necessary for Joan to receive every 6 month injections of Prolia.  Next DEXA scan not due until 8/2025.

## 2024-11-08 NOTE — Clinical Note
Joan heard from [facility] pharmacy that they were still waiting on prior authroiszation.  Ok to send this note if needed. MP

## 2024-11-08 NOTE — ASSESSMENT & PLAN NOTE
11/8/24 Wellness/cpe covered by [facility] -- annual mammo. every 2 year dexa has been stable. Screening colonoscopy -- no further screening required after age 75.      (Bill 9/2020) -- had services in 2023 d/t covid.     UTD per patient on pneumonia and tetanus, zostavax, vaccines. Prevnar 4/11/17.     Pain level 0/10.      11/8/24  Medicare wellness subsequent eval -- no falls, no dementia, no depression, vaccines utd, no loss of adls. No alcohol. no tobacco. no opioids. No hospitalization.      11/8/24  I spent 15 minutes face-to-face with this individual discussing their cardiovascular risk and behavioral therapies of nutritional choices, exercise, and elimination of habits contributing to risk. We agreed on a plan addressing reduction of their current cardiovascular risk. Patient's 10 year CV risk estimate calculates to: 3.2 %      Encourage RSV vaccine x 1.  Annual covid and flu UTD.     # Left great toe 1st PIP valgus deformity -- to see DPM Dr Perez.      # Muscle cramping -- potassium on low side -- encourage OTC Magnesium/Potassium supplement at bedtime.     # Left foot plantar wart -- treated per podiatry Dr Perez.     # Right eye skin tag -- tiny not bothering her -- Dr Sorto did not think it needed to remove.   # Hx BPPV -- stable now.   # Hx of lymphoma -- follow up with Dr Law in May. Completed chemo 2012.    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   # hx Right ring finger palmar tendon contracture -- referred to hand surgeon.

## 2024-11-19 ENCOUNTER — TELEPHONE (OUTPATIENT)
Dept: PRIMARY CARE | Facility: CLINIC | Age: 84
End: 2024-11-19

## 2024-11-22 ENCOUNTER — CLINICAL SUPPORT (OUTPATIENT)
Dept: PRIMARY CARE | Facility: CLINIC | Age: 84
End: 2024-11-22
Payer: MEDICARE

## 2024-11-22 DIAGNOSIS — C43.61 MALIGNANT MELANOMA OF RIGHT FOREARM (MULTI): ICD-10-CM

## 2024-11-22 DIAGNOSIS — M81.0 OSTEOPOROSIS, POST-MENOPAUSAL: ICD-10-CM

## 2024-11-22 PROCEDURE — 96372 THER/PROPH/DIAG INJ SC/IM: CPT | Performed by: FAMILY MEDICINE

## 2025-01-27 ENCOUNTER — APPOINTMENT (OUTPATIENT)
Dept: DERMATOLOGY | Facility: CLINIC | Age: 85
End: 2025-01-27
Payer: MEDICARE

## 2025-01-27 DIAGNOSIS — L57.0 ACTINIC KERATOSIS: Primary | ICD-10-CM

## 2025-01-27 DIAGNOSIS — C44.612 BASAL CELL CARCINOMA (BCC) OF SKIN OF RIGHT UPPER EXTREMITY INCLUDING SHOULDER: ICD-10-CM

## 2025-01-27 DIAGNOSIS — C44.519 BASAL CELL CARCINOMA (BCC) OF SKIN OF OTHER PART OF TORSO: ICD-10-CM

## 2025-01-27 PROCEDURE — 11602 EXC TR-EXT MAL+MARG 1.1-2 CM: CPT | Performed by: DERMATOLOGY

## 2025-01-27 PROCEDURE — 17000 DESTRUCT PREMALG LESION: CPT | Performed by: DERMATOLOGY

## 2025-01-27 PROCEDURE — 11603 EXC TR-EXT MAL+MARG 2.1-3 CM: CPT | Performed by: DERMATOLOGY

## 2025-01-27 NOTE — PROGRESS NOTES
Excision Operative Note    Date of Surgery:  1/27/2025  Surgeon:  Carolyne Sheppard MD  Office Location:  [address] [facility]  [address]  [facility] [address]  Dept: [phone]  Dept Fax: [phone]  Referring Provider: Augusto Cohn, APRN-CNP  [address] [facility] [address]  [place] [address]    [facility]   Joan Brown is a 84 y.o. female who presents for the following: Excision (BCC- Left and Right Upper Back ) for basal cell carcinoma.    According to the patient, the lesion has been present for approximately 1 month at the time of diagnosis.  The lesion is not causing symptoms.  The lesion has not been treated previously.    The patient does not have a pacemaker / defibrillator.  The patient does have a heart valve / joint replacement.    The patient is on blood thinners.   The patient does not have a history of hepatitis B or C.  The patient does not have a history of HIV.  The patient does not have a history of immunosuppression (e.g. organ transplantation, malignancy, medications)    Is it okay to leave a phone message with results? {Y/N}  Who else may we leave results with: (name, relationship) ***    The following portions of the chart were reviewed this encounter and updated as appropriate:         Assessment/Plan   Pre-procedure:   Obtained informed consent: written from patient  The surgical site was identified and confirmed with the patient.     Intra-operative:   Audible time out called at : 1:17 PM 01/27/25  by: Phillip Call RN   Verified patient name, birthdate, site, specimen bottle label & requisition.    The planned procedure(s) was again reviewed with the patient. The risks of bleeding, infection, nerve damage and scarring were reviewed. The patient identity, surgical site, and planned procedure(s) were verified.     Biopsy Accession Number: ***  Basal cell carcinoma (BCC) of skin of other part of torso (2)  Right Upper Back    Skin  excision    Informed consent: discussed and consent obtained    Timeout: patient name, date of birth, surgical site, and procedure verified    Procedure prep:  Patient prepped in sterile fashion  Anesthesia: the lesion was anesthetized in a standard fashion    Anesthetic:  1% lidocaine w/ epinephrine 1-100,000 local infiltration  Instrument used: #15 blade    Hemostasis achieved with: electrodesiccation    Outcome: patient tolerated procedure well with no complications    Post-procedure details: sterile dressing applied and wound care instructions given    Dressing type: pressure dressing    Additional details:  The nature of the diagnosis was explained. The lesion is a skin cancer.  It is locally aggressive but has a very low to non-existent risk of spreading.  The condition is associated with sun exposure.  Warning signs of non-melanoma skin cancer discussed. Patient was instructed to perform monthly self skin examination.  We recommended that the patient have regular full skin exams given an increased risk of subsequent skin cancers. The patient was instructed to use sun protective behaviors including use of broad spectrum sunscreens and sun protective clothing to reduce risk of skin cancers.  Excision was discussed with the patient. The risks, benefits and potential adverse effects were reviewed. Discussion included but was not limited to the cure rate, relative cost, wound care requirements, activity restrictions, likely scar outcome and time to heal were reviewed. It was explained that the scar would be longer than the original lesion.  The patient elected to proceed with exicision today.    Skin repair  Complexity:  Intermediate  Informed consent: discussed and consent obtained    Timeout: patient name, date of birth, surgical site, and procedure verified    Procedure prep:  Patient prepped in sterile fashion  Anesthesia: the lesion was anesthetized in a standard fashion    Anesthetic:  1% lidocaine w/  epinephrine 1-100,000 local infiltration  Reason for type of repair: reduce tension to allow closure    Undermining: edges undermined    Subcutaneous layers (deep stitches):   Stitches:  Buried vertical mattress  Fine/surface layer approximation (top stitches):   Hemostasis achieved with: electrodesiccation  Outcome: patient tolerated procedure well with no complications    Post-procedure details: sterile dressing applied and wound care instructions given    Dressing type: pressure dressing      Specimen 1 - Dermatopathology- DERM LAB  Differential Diagnosis: BCC  Check Margins Yes   Comments:    Dermpath Lab: Routine Histopathology (formalin-fixed tissue)    Left Upper Back    Skin excision    Informed consent: discussed and consent obtained    Timeout: patient name, date of birth, surgical site, and procedure verified    Procedure prep:  Patient prepped in sterile fashion  Anesthesia: the lesion was anesthetized in a standard fashion    Anesthetic:  1% lidocaine w/ epinephrine 1-100,000 local infiltration  Instrument used: #15 blade    Hemostasis achieved with: electrodesiccation    Outcome: patient tolerated procedure well with no complications    Post-procedure details: sterile dressing applied and wound care instructions given    Dressing type: pressure dressing    Additional details:  The nature of the diagnosis was explained. The lesion is a skin cancer.  It is locally aggressive but has a very low to non-existent risk of spreading.  The condition is associated with sun exposure.  Warning signs of non-melanoma skin cancer discussed. Patient was instructed to perform monthly self skin examination.  We recommended that the patient have regular full skin exams given an increased risk of subsequent skin cancers. The patient was instructed to use sun protective behaviors including use of broad spectrum sunscreens and sun protective clothing to reduce risk of skin cancers.  Excision was discussed with the patient.  The risks, benefits and potential adverse effects were reviewed. Discussion included but was not limited to the cure rate, relative cost, wound care requirements, activity restrictions, likely scar outcome and time to heal were reviewed. It was explained that the scar would be longer than the original lesion.  The patient elected to proceed with exicision today.    Skin repair  Complexity:  Intermediate  Informed consent: discussed and consent obtained    Timeout: patient name, date of birth, surgical site, and procedure verified    Procedure prep:  Patient prepped in sterile fashion  Anesthesia: the lesion was anesthetized in a standard fashion    Anesthetic:  1% lidocaine w/ epinephrine 1-100,000 local infiltration  Reason for type of repair: reduce tension to allow closure    Undermining: edges undermined    Subcutaneous layers (deep stitches):   Stitches:  Buried vertical mattress  Fine/surface layer approximation (top stitches):   Hemostasis achieved with: electrodesiccation  Outcome: patient tolerated procedure well with no complications    Post-procedure details: sterile dressing applied and wound care instructions given    Dressing type: pressure dressing      Specimen 2 - Dermatopathology- DERM LAB  Differential Diagnosis: BCC  Check Margins YeS  Comments:    Dermpath Lab: Routine Histopathology (formalin-fixed tissue)      {MOHS - Linear Closure (Optional):98156}    The final repair measured *** cm    Wound care was discussed, and the patient was given written post-operative wound care instructions.      The patient will follow up with Carolyne Sheppard MD as needed for any post operative problems or concerns, and will follow up with their primary dermatologist as scheduled.

## 2025-01-27 NOTE — PROGRESS NOTES
Electrodesiccation and Curettage Operative Note and Cryosurgery Note    Date of Surgery:  1/27/2025  Surgeon:  Carolyne Sheppard MD  Office Location:  [address] [facility]  [address]  [facility] [address]  Dept: [phone]  Dept Fax: [phone]  Referring Provider: Augusto Cohn, APRN-CNP  [address] [facility] [address]  [place] [address]    Assessment/Plan   Basal cell carcinoma- right upper back, left upper back, right upper arm-posterior superior  The nature of the diagnosis was explained. The lesion is a skin cancer.  It is locally aggressive but has a very low to non-existent risk of spreading.  The condition is associated with sun exposure.  Warning signs of non-melanoma skin cancer discussed. We recommended that the patient have regular full skin exams given an increased risk of subsequent skin cancers. The patient was instructed to use sun protective behaviors including use of broad spectrum sunscreens and sun protective clothing to reduce risk of skin cancers.     The treatment options including excision and ED&C (given the superficial nature of the skin cancer) were discussed. Risks and benefits of each procedure were reviewed. After discussion, the patient elected to proceed with ED&C. The patient understands that the specimen will not be sent to pathology. The patient was also advised that should the lesion recur, it may be treated with excision or Mohs surgery at that time.    2. Actinic keratosis  Right Upper Arm - Posterior  Scar c/w recent biopsy    Pre-procedure:   Obtained informed consent: written from patient    Intra-operative:   Audible time out called at : 1:39PM 01/27/25  by: Phillip Call RN   Verified patient name, birthdate, site, specimen bottle label & requisition.    The planned procedure(s) was again reviewed with the patient. The risks of bleeding, infection, nerve damage and scarring were reviewed. The patient identify, surgical site,  and planned procedure(s) were verified.     Basal cell carcinoma (BCC) of skin of other part of torso (2)    Right Upper Back  ED&C    Lesion length (cm):  0.5  Lesion width (cm):  0.7  Margin per side (cm):  0.5  Total excision diameter (cm):  1.7  Informed consent: discussed and consent obtained    Timeout: patient name, date of birth, surgical site, and procedure verified    Procedure prep:  Patient was prepped and draped  Anesthesia: the lesion was anesthetized in a standard fashion    Anesthetic:  Lidocaine 2% with epinephrine  Hemostasis achieved with: electrodesiccation    Outcome: patient tolerated procedure well with no complications    Post-procedure details: sterile dressing applied and wound care instructions given    Dressing type: pressure dressing, Telfa pad, petrolatum and Hypafix    Additional details:  The lesion was treated via destruction using the electrodesiccation and curettage technique. Three passes were performed.  The patient understands that the specimen will not be sent to pathology. The patient was also advised that should the lesion recur, it may be treated with excision or Mohs surgery at that time.    2. Left Upper Back  ED&C    Lesion length (cm):  1  Lesion width (cm):  1.1  Margin per side (cm):  0.5  Total excision diameter (cm):  2.1  Informed consent: discussed and consent obtained    Timeout: patient name, date of birth, surgical site, and procedure verified    Procedure prep:  Patient was prepped and draped  Anesthesia: the lesion was anesthetized in a standard fashion    Anesthetic:  Lidocaine 2% with epinephrine  Hemostasis achieved with: electrodesiccation    Outcome: patient tolerated procedure well with no complications    Post-procedure details: sterile dressing applied and wound care instructions given    Dressing type: pressure dressing, Telfa pad, petrolatum and Hypafix    Additional details:  The lesion was treated via destruction using the electrodesiccation and  curettage technique. Three passes were performed.  The patient understands that the specimen will not be sent to pathology. The patient was also advised that should the lesion recur, it may be treated with excision or Mohs surgery at that time.    3. Basal cell carcinoma (BCC) of skin of right upper extremity including shoulder  Right Upper Arm - Posterior Superior  ED&C    Lesion length (cm):  0.6  Lesion width (cm):  0.7  Margin per side (cm):  0.5  Total excision diameter (cm):  1.7  Informed consent: discussed and consent obtained    Timeout: patient name, date of birth, surgical site, and procedure verified    Procedure prep:  Patient was prepped and draped  Anesthesia: the lesion was anesthetized in a standard fashion    Anesthetic:  Lidocaine 2% with epinephrine  Hemostasis achieved with: electrodesiccation    Outcome: patient tolerated procedure well with no complications    Post-procedure details: sterile dressing applied and wound care instructions given    Dressing type: pressure dressing, petrolatum, Telfa pad and Hypafix    Additional details:  The lesion was treated via destruction using the electrodesiccation and curettage technique. Three passes were performed.  The patient understands that the specimen will not be sent to pathology. The patient was also advised that should the lesion recur, it may be treated with excision or Mohs surgery at that time.                4. Actinic Keratosis  Destr of lesion - Right Upper Arm - Posterior  Complexity: simple    Destruction method: cryotherapy    Informed consent: discussed and consent obtained    Lesion destroyed using liquid nitrogen: Yes    Region frozen until ice ball extended beyond lesion: Yes    Cryotherapy cycles:  2  Outcome: patient tolerated procedure well with no complications    Post-procedure details: wound care instructions give  Wound care was discussed, and the patient was given written post-operative wound care instructions.      The  patient will follow up with Carolyne Sheppard MD as needed for any post operative problems or concerns, and will follow up with their primary dermatologist as scheduled.

## 2025-02-03 ENCOUNTER — APPOINTMENT (OUTPATIENT)
Dept: DERMATOLOGY | Facility: CLINIC | Age: 85
End: 2025-02-03
Payer: MEDICARE

## 2025-03-11 ENCOUNTER — OFFICE VISIT (OUTPATIENT)
Dept: PRIMARY CARE | Facility: CLINIC | Age: 85
End: 2025-03-11
Payer: MEDICARE

## 2025-03-11 VITALS
BODY MASS INDEX: 23.6 KG/M2 | WEIGHT: 125 LBS | HEIGHT: 61 IN | OXYGEN SATURATION: 99 % | HEART RATE: 85 BPM | TEMPERATURE: 97.5 F | DIASTOLIC BLOOD PRESSURE: 82 MMHG | SYSTOLIC BLOOD PRESSURE: 126 MMHG

## 2025-03-11 DIAGNOSIS — J06.9 UPPER RESPIRATORY TRACT INFECTION, UNSPECIFIED TYPE: Primary | ICD-10-CM

## 2025-03-11 PROCEDURE — 1160F RVW MEDS BY RX/DR IN RCRD: CPT | Performed by: INTERNAL MEDICINE

## 2025-03-11 PROCEDURE — 3079F DIAST BP 80-89 MM HG: CPT | Performed by: INTERNAL MEDICINE

## 2025-03-11 PROCEDURE — 1036F TOBACCO NON-USER: CPT | Performed by: INTERNAL MEDICINE

## 2025-03-11 PROCEDURE — 1157F ADVNC CARE PLAN IN RCRD: CPT | Performed by: INTERNAL MEDICINE

## 2025-03-11 PROCEDURE — 99213 OFFICE O/P EST LOW 20 MIN: CPT | Performed by: INTERNAL MEDICINE

## 2025-03-11 PROCEDURE — 3074F SYST BP LT 130 MM HG: CPT | Performed by: INTERNAL MEDICINE

## 2025-03-11 PROCEDURE — 1159F MED LIST DOCD IN RCRD: CPT | Performed by: INTERNAL MEDICINE

## 2025-03-11 ASSESSMENT — ENCOUNTER SYMPTOMS
EYE PAIN: 0
CONSTIPATION: 0
WOUND: 0
HEMATURIA: 0
APPETITE CHANGE: 0
ABDOMINAL PAIN: 0
SINUS PAIN: 0
WHEEZING: 0
PHOTOPHOBIA: 0
ACTIVITY CHANGE: 0
BACK PAIN: 0
FLANK PAIN: 0
ARTHRALGIAS: 0
BLOOD IN STOOL: 0
NECK PAIN: 0
NAUSEA: 0
SEIZURES: 0
CHEST TIGHTNESS: 0
HEADACHES: 0
COUGH: 1
DIZZINESS: 0
ADENOPATHY: 0
TREMORS: 0
VOICE CHANGE: 0
PALPITATIONS: 0
COLOR CHANGE: 0
WEAKNESS: 0
NERVOUS/ANXIOUS: 0
DYSURIA: 0
STRIDOR: 0
FATIGUE: 0
TROUBLE SWALLOWING: 0
SHORTNESS OF BREATH: 0
UNEXPECTED WEIGHT CHANGE: 0
FEVER: 0
CONFUSION: 0
NUMBNESS: 0
SORE THROAT: 0
DIARRHEA: 0
MYALGIAS: 0
JOINT SWELLING: 0

## 2025-03-11 NOTE — PROGRESS NOTES
"Subjective   Patient ID: Joan Brown is a 84 y.o. female who presents for Bronchitis.    HPI   Patient presented to the office for the symptoms similar to bronchitis which include cough, congestion from past 11 days. Symptoms are getting better slowly.     Review of Systems   Constitutional:  Negative for activity change, appetite change, fatigue, fever and unexpected weight change.   HENT:  Positive for congestion. Negative for dental problem, ear discharge, hearing loss, nosebleeds, postnasal drip, sinus pain, sore throat, trouble swallowing and voice change.    Eyes:  Negative for photophobia, pain and visual disturbance.   Respiratory:  Positive for cough. Negative for chest tightness, shortness of breath, wheezing and stridor.    Cardiovascular:  Negative for chest pain, palpitations and leg swelling.   Gastrointestinal:  Negative for abdominal pain, blood in stool, constipation, diarrhea and nausea.   Genitourinary:  Negative for decreased urine volume, dysuria, flank pain, hematuria and urgency.   Musculoskeletal:  Negative for arthralgias, back pain, gait problem, joint swelling, myalgias and neck pain.   Skin:  Negative for color change, rash and wound.   Allergic/Immunologic: Negative for environmental allergies and food allergies.   Neurological:  Negative for dizziness, tremors, seizures, syncope, weakness, numbness and headaches.   Hematological:  Negative for adenopathy.   Psychiatric/Behavioral:  Negative for confusion and suicidal ideas. The patient is not nervous/anxious.      Objective   /82   Pulse 85   Temp 36.4 °C (97.5 °F)   Ht 1.549 m (5' 1\")   Wt 56.7 kg (125 lb)   SpO2 99%   BMI 23.62 kg/m²     Physical Exam  Vitals and nursing note reviewed.   Constitutional:       General: She is not in acute distress.     Appearance: She is not ill-appearing or toxic-appearing.   HENT:      Head: Normocephalic.      Nose: Congestion present.   Eyes:      General:         Right eye: No " discharge.         Left eye: No discharge.      Conjunctiva/sclera: Conjunctivae normal.   Cardiovascular:      Rate and Rhythm: Normal rate and regular rhythm.      Pulses: Normal pulses.      Heart sounds: Normal heart sounds. No murmur heard.  Pulmonary:      Effort: Pulmonary effort is normal. No respiratory distress.      Breath sounds: Normal breath sounds. No stridor. No wheezing, rhonchi or rales.   Musculoskeletal:         General: No swelling or deformity. Normal range of motion.   Skin:     General: Skin is warm.      Coloration: Skin is not jaundiced.      Findings: No erythema or rash.   Neurological:      General: No focal deficit present.      Mental Status: She is alert and oriented to person, place, and time.   Psychiatric:         Mood and Affect: Mood normal.         Behavior: Behavior normal.         Thought Content: Thought content normal.         Judgment: Judgment normal.       Assessment/Plan   Problem List Items Addressed This Visit    None  Visit Diagnoses       Upper respiratory tract infection, unspecified type    -  Primary        Patient has been discussed that physical exam, lung auscultation finding is normal.  Vitals are stable.  I do not see any specific problem and as your symptoms are improving, we just have to make sure that they keep on improving.    Only warm fluids and no ice cold water or beverages.  You can take OTC cough medicine.    Call office if you develop Temperature of more 100.4 which is not responsive to tylenol.  Also excessive shortness of breath or you run out of the air before completing the sentence.    Patient verbalized understanding.

## 2025-04-21 ENCOUNTER — APPOINTMENT (OUTPATIENT)
Dept: DERMATOLOGY | Facility: CLINIC | Age: 85
End: 2025-04-21
Payer: MEDICARE

## 2025-04-21 DIAGNOSIS — L82.1 SEBORRHEIC KERATOSIS: ICD-10-CM

## 2025-04-21 DIAGNOSIS — L81.4 LENTIGO: ICD-10-CM

## 2025-04-21 DIAGNOSIS — D18.01 ANGIOMA OF SKIN: ICD-10-CM

## 2025-04-21 DIAGNOSIS — L57.0 ACTINIC KERATOSIS: Primary | ICD-10-CM

## 2025-04-21 DIAGNOSIS — L57.9 SKIN CHANGES DUE TO CHRONIC EXPOSURE TO NONIONIZING RADIATION: ICD-10-CM

## 2025-04-21 DIAGNOSIS — L90.5 SCAR CONDITIONS AND FIBROSIS OF SKIN: ICD-10-CM

## 2025-04-21 DIAGNOSIS — D48.5 NEOPLASM OF UNCERTAIN BEHAVIOR OF SKIN: ICD-10-CM

## 2025-04-21 DIAGNOSIS — D22.9 BENIGN NEVUS: ICD-10-CM

## 2025-04-21 PROCEDURE — 17003 DESTRUCT PREMALG LES 2-14: CPT | Performed by: NURSE PRACTITIONER

## 2025-04-21 PROCEDURE — 17000 DESTRUCT PREMALG LESION: CPT | Performed by: NURSE PRACTITIONER

## 2025-04-21 PROCEDURE — 1160F RVW MEDS BY RX/DR IN RCRD: CPT | Performed by: NURSE PRACTITIONER

## 2025-04-21 PROCEDURE — 99213 OFFICE O/P EST LOW 20 MIN: CPT | Performed by: NURSE PRACTITIONER

## 2025-04-21 PROCEDURE — 11102 TANGNTL BX SKIN SINGLE LES: CPT | Performed by: NURSE PRACTITIONER

## 2025-04-21 PROCEDURE — 1036F TOBACCO NON-USER: CPT | Performed by: NURSE PRACTITIONER

## 2025-04-21 PROCEDURE — 1157F ADVNC CARE PLAN IN RCRD: CPT | Performed by: NURSE PRACTITIONER

## 2025-04-21 PROCEDURE — 1159F MED LIST DOCD IN RCRD: CPT | Performed by: NURSE PRACTITIONER

## 2025-04-21 NOTE — PROGRESS NOTES
Subjective     Joan Brown is a 84 y.o. female who presents for the following: Skin Check.     Review of Systems:  No other skin or systemic complaints other than what is documented elsewhere in the note.    The following portions of the chart were reviewed this encounter and updated as appropriate:   Tobacco  Allergies  Meds  Problems  Med Hx  Surg Hx         Skin Cancer History  Biopsy Log Book  Biopsied Type Location Status   7/31/24 BCC Mid Tip of Nose Refer Mohs/Surgeon  1/27/25 7/31/24 SCC Left Lower Leg - Anterior Refer Mohs/Surgeon  1/27/25   10/14/24 BCC Right Upper Back Refer Mohs/Surgeon  1/27/25   10/14/24 BCC Left Upper Back Refer Mohs/Surgeon  1/27/25   10/14/24 BCC Right Upper Arm - Posterior Refer Mohs/Surgeon  1/27/25       Additional History      Specialty Problems          Dermatology Problems    BCC (basal cell carcinoma of skin)    5/20/24 S/p mohs on back.  ([facility]) Bx 4/25/24.     8/27/24 s/p mohs on leg, nose ([facility])         Actinic keratoses    Dyshidrosis (pompholyx)    Melanocytic nevi of trunk    Onycholysis    Other specified follicular disorders    Malignant melanoma of right forearm ([facility])    treated by Derm ([facility])  in 2019             Objective   Well appearing patient in no apparent distress; mood and affect are within normal limits.    A full examination was performed including scalp, head, eyes, ears, nose, lips, neck, chest, axillae, abdomen, back, buttocks, bilateral upper extremities, bilateral lower extremities, hands, feet, fingers, toes, fingernails, and toenails. All findings within normal limits unless otherwise noted below.      Assessment/Plan   Skin Exam  1. ACTINIC KERATOSIS (5)  Left Forehead (2), Left Knee - Anterior, Right Malar Cheek, Right Thigh - Anterior  Thin erythematous papules with gritty scale  WHAT IS ACTINIC KERATOSIS?   - Actinic keratosis (AK) is a skin condition caused by sun damage. It causes scaly, rough, or bumpy spots on  the skin.  - If left alone, AKs may turn into a skin cancer. People who burn easily or have trouble tanning are at more risk for developing AKs.   - There is no one test for AKs and diagnosis is made by clinical appearance. Treatment options include cryotherapy, therapy with lights, and various creams (e.g., topical 5-fluorocuracil, imiquimod).       To lower the chance of getting AK, you can:       ?  Stay out of the sun in the middle of the day (from 10 a.m. to 4 p.m.)       ?  Wear sunscreen - An SPF of at least 30 is best. The SPF number is on the sunscreen bottle or tube.       ?  Wear a wide-brimmed hat, long-sleeved shirt, long pants, or long skirt outside. A baseball hat does not give much protection.        ?  Do not use tanning beds.        ?  Keep a low-fat diet, less than 21% of calories should come from fat       ?  Take Vitamin B3 (nicotinomide) 500mg twice daily.      YOUR TREATMENT PLAN  - At this time I recommend treatment with cryotherapy.  - Possible side effects of liquid nitrogen treatment reviewed including formation of blisters, crusting, tenderness, scar, and discoloration which may be permanent.  - Patient advised to return the office for re-evaluation if the treated lesion(s) do not resolve within 4-6 weeks. Patient verbalizes understanding.  Destr of lesion - Left Forehead (2), Left Knee - Anterior, Right Malar Cheek, Right Thigh - Anterior  Complexity: simple    Destruction method: cryotherapy    Informed consent: discussed and consent obtained    Timeout:  patient name, date of birth, surgical site, and procedure verified  Lesion destroyed using liquid nitrogen: Yes    Cryotherapy cycles:  2  Outcome: patient tolerated procedure well with no complications    Post-procedure details: wound care instructions given    Related Procedures  Follow Up In Dermatology - Established Patient  2. NEOPLASM OF UNCERTAIN BEHAVIOR OF SKIN  Left Lower Leg - Anterior  22 x 35 mm irregular shape  asymmetrical red scaly plaque with some areas of dark brown pigment and polymorphous vessels noted.     Lesion biopsy  Type of biopsy: tangential    Informed consent: discussed and consent obtained    Timeout: patient name, date of birth, surgical site, and procedure verified    Procedure prep:  Patient was prepped and draped  Anesthesia: the lesion was anesthetized in a standard fashion    Anesthetic:  1% lidocaine plain local infiltration  Instrument used: DermaBlade    Hemostasis achieved with: electrodesiccation    Outcome: patient tolerated procedure well    Post-procedure details: wound care instructions given    Additional details:  Cleaned area with isopropyl alcohol prior to anesthesia or biopsy. Applied thin layer of vaseline and covered with bandaid after procedure      Staff Communication: X Dermatology Local Anesthesia: 1 % Plain Lidocaine - Amount: 0.5 ml  Specimen 1 - Dermatopathology- DERM LAB  Differential Diagnosis: AMM vs NMSC  Check Margins Yes/No?:    Comments:    Dermpath Lab: Routine Histopathology (formalin-fixed tissue)  NUB  - Given uncertainty in clinical diagnosis, shave biopsy is recommended in clinic today.  - The patient expressed understanding, is in agreement with this plan, and wishes to proceed with biopsy.  - Oral and written wound care instructions provided.  - Advised patient that the office will call within 2 weeks to discuss biopsy results.    Related Procedures  Follow Up In Dermatology - Established Patient  Follow Up In Dermatology - Established Patient  3. SEBORRHEIC KERATOSIS  Generalized  Stuck on verrucous, tan-brown papules and plaques.    Seborrheic keratoses are common noncancerous (benign) growths of unknown cause seen in adults due to a thickening of an area of the top skin layer. Seborrheic keratoses may appear as if they are stuck on to the skin. They have distinct borders, and they may appear as papules (small, solid bumps) or plaques (solid, raised patches that  are bigger than a thumbnail). They may be the same color as your skin, or they may be pink, light brown, darker brown, or very dark brown, or sometimes may appear black.    There is no way to prevent new seborrheic keratoses from forming. Seborrheic keratoses can be removed, but removal is considered a cosmetic issue and is usually not covered by insurance.    PLAN  No treatment is needed unless there is irritation from clothing, such as itching or bleeding.  2.   Some lotions containing alpha hydroxy acids, salicylic acid, or urea may make the areas feel smoother with regular use but will not eliminate them.  Related Procedures  Follow Up In Dermatology - Established Patient  Follow Up In Dermatology - Established Patient  4. SKIN CHANGES DUE TO CHRONIC EXPOSURE TO NONIONIZING RADIATION      Related Procedures  Follow Up In Dermatology - Established Patient  Follow Up In Dermatology - Established Patient  5. ANGIOMA OF SKIN  Generalized  Scattered cherry-red papule(s).  A cherry hemangioma is a small macule (small, flat, smooth area) or papule (small, solid bump) formed from an overgrowth of tiny blood vessels in the skin. Cherry hemangiomas are characteristically red or purplish in color. They often first appear in middle adulthood and usually increase in number with age. Cherry hemangiomas are noncancerous (benign) and are common in adults.    The present appearance of the lesion is not worrisome but it should continue to be observed and testing/treatment may be warranted if change occurs.  Related Procedures  Follow Up In Dermatology - Established Patient  Follow Up In Dermatology - Established Patient  6. BENIGN NEVUS  Generalized  Scattered, uniform and benign-appearing, regular brown melanocytic papules and macules.  The present appearance of the lesion is not worrisome but it should continue to be observed and testing/treatment may be warranted if change occurs.  Related Procedures  Follow Up In Dermatology  - Established Patient  Follow Up In Dermatology [facility] Patient  7. LENTIGO  Generalized  Scattered tan macules in sun-exposed areas.  A solar lentigo (plural, solar lentigines), also known as a sun-induced freckle or senile lentigo, is a dark (hyperpigmented) lesion caused by natural or artificial ultraviolet (UV) light. Solar lentigines may be single or multiple. This type of lentigo is different from a simple lentigo (lentigo simplex) because it is caused by exposure to UV light. Solar lentigines are benign, but they do indicate excessive sun exposure, a risk factor for the development of skin cancer.    To prevent solar lentigines, avoid exposure to sunlight in midday (10 AM to 3 PM), wear sun-protective clothing (tightly woven clothes and hats), and apply sunscreen (SPF 30 UVA and UVB block).    The present appearance of the lesion is not worrisome but it should continue to be observed and testing/treatment may be warranted if change occurs.  Related Procedures  Follow Up In Dermatology [facility] Patient  Follow Up In [facility] Patient  8. SCAR CONDITIONS AND FIBROSIS OF SKIN (8)  Left Lower Leg - Anterior, Left Upper Back, Nose, Right Breast, Right Forearm - Posterior, Right Upper Arm - Posterior, Right Upper Back (2)  Well healed scar at the site(s) of prior treatment with no evidence of recurrence.        The scar is clear, there is no evidence of recurrence.  The present appearance of the scar is not worrisome but it should continue to be observed and testing/treatment may be warranted if change occurs.    Related Procedures  Follow Up In [facility] Patient  Follow Up In [facility] Patient      Return in 6 months for routine skin check or return to clinic sooner if needed

## 2025-04-21 NOTE — Clinical Note
A solar lentigo (plural, solar lentigines), also known as a sun-induced freckle or senile lentigo, is a dark (hyperpigmented) lesion caused by natural or artificial ultraviolet (UV) light. Solar lentigines may be single or multiple. This type of lentigo is different from a simple lentigo (lentigo simplex) because it is caused by exposure to UV light. Solar lentigines are benign, but they do indicate excessive sun exposure, a risk factor for the development of skin cancer.    To prevent solar lentigines, avoid exposure to sunlight in midday (10 AM to 3 PM), wear sun-protective clothing (tightly woven clothes and hats), and apply sunscreen (SPF 30 UVA and UVB block).    The present appearance of the lesion is not worrisome but it should continue to be observed and testing/treatment may be warranted if change occurs.

## 2025-04-21 NOTE — Clinical Note
The scar is clear, there is no evidence of recurrence.  The present appearance of the scar is not worrisome but it should continue to be observed and testing/treatment may be warranted if change occurs.

## 2025-04-21 NOTE — Clinical Note
WHAT IS ACTINIC KERATOSIS?   - Actinic keratosis (AK) is a skin condition caused by sun damage. It causes scaly, rough, or bumpy spots on the skin.  - If left alone, AKs may turn into a skin cancer. People who burn easily or have trouble tanning are at more risk for developing AKs.   - There is no one test for AKs and diagnosis is made by clinical appearance. Treatment options include cryotherapy, therapy with lights, and various creams (e.g., topical 5-fluorocuracil, imiquimod).       To lower the chance of getting AK, you can:       ?  Stay out of the sun in the middle of the day (from 10 a.m. to 4 p.m.)       ?  Wear sunscreen - An SPF of at least 30 is best. The SPF number is on the sunscreen bottle or tube.       ?  Wear a wide-brimmed hat, long-sleeved shirt, long pants, or long skirt outside. A baseball hat does not give much protection.        ?  Do not use tanning beds.        ?  Keep a low-fat diet, less than 21% of calories should come from fat       ?  Take Vitamin B3 (nicotinomide) 500mg twice daily.      YOUR TREATMENT PLAN  - At this time I recommend treatment with cryotherapy.  - Possible side effects of liquid nitrogen treatment reviewed including formation of blisters, crusting, tenderness, scar, and discoloration which may be permanent.  - Patient advised to return the office for re-evaluation if the treated lesion(s) do not resolve within 4-6 weeks. Patient verbalizes understanding.

## 2025-04-21 NOTE — Clinical Note
A cherry hemangioma is a small macule (small, flat, smooth area) or papule (small, solid bump) formed from an overgrowth of tiny blood vessels in the skin. Cherry hemangiomas are characteristically red or purplish in color. They often first appear in middle adulthood and usually increase in number with age. Cherry hemangiomas are noncancerous (benign) and are common in adults.    The present appearance of the lesion is not worrisome but it should continue to be observed and testing/treatment may be warranted if change occurs.

## 2025-04-21 NOTE — Clinical Note
Seborrheic keratoses are common noncancerous (benign) growths of unknown cause seen in adults due to a thickening of an area of the top skin layer. Seborrheic keratoses may appear as if they are stuck on to the skin. They have distinct borders, and they may appear as papules (small, solid bumps) or plaques (solid, raised patches that are bigger than a thumbnail). They may be the same color as your skin, or they may be pink, light brown, darker brown, or very dark brown, or sometimes may appear black.    There is no way to prevent new seborrheic keratoses from forming. Seborrheic keratoses can be removed, but removal is considered a cosmetic issue and is usually not covered by insurance.    PLAN  No treatment is needed unless there is irritation from clothing, such as itching or bleeding.  2.   Some lotions containing alpha hydroxy acids, salicylic acid, or urea may make the areas feel smoother with regular use but will not eliminate them.

## 2025-04-21 NOTE — Clinical Note
22 x 35 mm irregular shape asymmetrical red scaly plaque with some areas of dark brown pigment and polymorphous vessels noted.

## 2025-04-21 NOTE — PATIENT INSTRUCTIONS

## 2025-04-21 NOTE — Clinical Note
The present appearance of the lesion is not worrisome but it should continue to be observed and testing/treatment may be warranted if change occurs.

## 2025-04-21 NOTE — Clinical Note
NUB  - Given uncertainty in clinical diagnosis, shave biopsy is recommended in clinic today.  - The patient expressed understanding, is in agreement with this plan, and wishes to proceed with biopsy.  - Oral and written wound care instructions provided.  - Advised patient that the office will call within 2 weeks to discuss biopsy results.

## 2025-04-25 DIAGNOSIS — C44.719 BASAL CELL CARCINOMA (BCC) OF LEFT LOWER LEG: ICD-10-CM

## 2025-05-06 ENCOUNTER — TELEPHONE (OUTPATIENT)
Dept: PRIMARY CARE | Facility: CLINIC | Age: 85
End: 2025-05-06
Payer: MEDICARE

## 2025-05-06 DIAGNOSIS — M81.0 AGE RELATED OSTEOPOROSIS, UNSPECIFIED PATHOLOGICAL FRACTURE PRESENCE: ICD-10-CM

## 2025-05-06 RX ORDER — DENOSUMAB 60 MG/ML
60 INJECTION SUBCUTANEOUS
Qty: 1 ML | Refills: 1 | Status: SHIPPED | OUTPATIENT
Start: 2025-05-06 | End: 2026-05-06

## 2025-05-06 NOTE — TELEPHONE ENCOUNTER
Pt called and stated [facility] pharmacy said her prolia injection is going to be 900$ and she can not afford that and would like to know what alternative Dr. Abreu has.

## 2025-05-07 PROCEDURE — RXMED WILLOW AMBULATORY MEDICATION CHARGE

## 2025-05-19 ENCOUNTER — SPECIALTY PHARMACY (OUTPATIENT)
Dept: PHARMACY | Facility: CLINIC | Age: 85
End: 2025-05-19

## 2025-05-19 ENCOUNTER — APPOINTMENT (OUTPATIENT)
Dept: DERMATOLOGY | Facility: CLINIC | Age: 85
End: 2025-05-19
Payer: MEDICARE

## 2025-05-19 VITALS — HEART RATE: 71 BPM | SYSTOLIC BLOOD PRESSURE: 179 MMHG | DIASTOLIC BLOOD PRESSURE: 76 MMHG

## 2025-05-19 DIAGNOSIS — C44.719 BASAL CELL CARCINOMA (BCC) OF SKIN OF LEFT LOWER EXTREMITY INCLUDING HIP: ICD-10-CM

## 2025-05-19 NOTE — PROGRESS NOTES
Mohs Surgery Operative Note    Date of Surgery:  5/19/2025  Surgeon:  Carolyne Sheppard MD  Office Location:  [address] [facility]  [address]  [address]  [facility] [address]  Dept: [phone]  Dept Fax: [phone]  Referring Provider: Augusto Cohn, APRN-CNP  [facility] [facility] [address]  [place] [address]      Assessment/Plan   Pre-procedure:   Obtained informed consent: written from patient  The surgical site was identified and confirmed with the patient.     Intra-operative:   Audible time out called at : 1:15 PM 05/19/25  by: Luba Saenz RN   Verified patient name, birthdate, site, specimen bottle label & requisition.    The planned procedure(s) was again reviewed with the patient. The risks of bleeding, infection, nerve damage and scarring were reviewed. Written authorization was obtained. The patient identity, surgical site, and planned procedure(s) were verified. The provider acted as both surgeon and pathologist.     BASAL CELL CARCINOMA (BCC) OF SKIN OF LEFT LOWER EXTREMITY INCLUDING HIP  Left Lower Leg - Anterior  Mohs surgery    Consent obtained: written    Universal Protocol:  Procedure explained and questions answered to patient or proxy's satisfaction: Yes    Test results available and properly labeled: Yes    Pathology report reviewed: Yes    External notes reviewed: Yes    Photo or diagram used for site identification: Yes    Site/side marked: Yes    Slide independently reviewed by Mohs surgeon: Yes    Immediately prior to procedure a time out was called: Yes    Patient identity confirmed: verbally with patient  Preparation: Patient was prepped and draped in usual sterile fashion      Anticoagulation:  Is the patient taking prescription anticoagulant and/or aspirin prescribed/recommended by a physician? Yes    Was the anticoagulation regimen changed prior to Mohs? No      Anesthesia:  Anesthesia method: local infiltration  Local anesthetic: lidocaine 1.5% WITH  epi.    Procedure Details:  Case ID Number: [id number]  Biopsy accession number: [id number]  Date of biopsy: 4/21/2025  Pre-Op diagnosis: basal cell carcinoma  BCC subtype: superficial  Surgical site (from skin exam): Left Lower Leg - Anterior  Pre-operative length (cm): 3.7  Pre-operative width (cm): 4.8  Indications for Mohs surgery: anatomic location where tissue conservation is critical and tumor size greater than 2 cm  Previously treated? No      Micrographic Surgery Details:  Post-operative length (cm): 4.4  Post-operative width (cm): 5.4  Number of Mohs stages: 3    Stage 1     Comments: The patient was brought into the operating room and placed in the procedure chair in the appropriate position.  The area positive by previous biopsy was identified and confirmed with the patient. The area of clinically obvious tumor was debulked using a curette and/or scalpel as needed. An incision was made following the Mohs approach through the skin. The specimen was taken to the lab, divided into 4 piece(s) and appropriately chromacoded and processed.  Tumor was seen on the lateral margins as indicated on the on the Mohs map.  Superficial basal cell carcinoma. Histologic examination revealed small buds of atypical basaloid cells with peripheral palisading and tumoral clefting.     Tumor features identified on Mohs section: basal carcinoma      Depth of invasion: Epidermis    Stage 2     Comments: The area of positivity as noted on the Mohs map in the previous stage was identified and removed using the Mohs technique. The specimen was taken to the lab and appropriately chromacoded and processed in 1 piece(s).  Tumor was seen on the lateral margins as indicated on the on the Mohs map.  Superficial basal cell carcinoma. Histologic examination revealed small buds of atypical basaloid cells with peripheral palisading and tumoral clefting.     Tumor features identified on Mohs section: basal carcinoma     Depth of invasion:  Epidermis    Stage 3     Comments: The area of positivity as noted on the Mohs map in the previous stage was identified and removed using the Mohs technique. The specimen was taken to the lab and appropriately chromacoded and processed in 1 piece(s).     Tumor features identified on Mohs section: no tumor identified    Depth of defect: subcutaneous fat    Patient tolerance of procedure: tolerated well, no immediate complications    Reconstruction:  Was the defect reconstructed?: No     Repair: After a discussion with the patient regarding the options for wound closure, a decision was made to proceed with second intention healing.    Dressing/Follow-up: Surgifoam was placed in the wound. A pressure dressing was placed to help stabilize the wound and to minimize the risk of postoperative bleeding. Wound care was discussed, and the patient was given written post-operative wound care instructions.        Fine/surface layer approximation (top stitches)   Hemostasis achieved with: electrodesiccation  Outcome: patient tolerated procedure well with no complications    Post-procedure details: sterile dressing applied and wound care instructions given    Dressing type: Gelfoam, Telfa pad, pressure dressing, Coban and Ace wrap      The final repair measured 4.4 x 5.4 cm            Wound care was discussed, and the patient was given written post-operative wound care instructions.      The patient will follow up with Carolyne Sheppard MD as needed for any post operative problems or concerns, and will follow up with their primary dermatologist as scheduled.       Luba JAMA RN  am scribing for, and in the presence of MD EVITA Castro Christina Y Wong, MD, personally performed the services described in the documentation as scribed by Luba Saenz RN in my presence, and confirm it is both accurate and complete.

## 2025-05-19 NOTE — PROGRESS NOTES
Office Visit Note  Date: 5/19/2025  Surgeon:  Carolyne Sheppard MD  Office Location:  [address] [facility]  [address]  [address]  [facility] [address]  Dept: [phone]  Dept Fax: [phone]  Referring Provider: Augusto Cohn, APRN-CNP  [address]  [address]  [place] [address]    Subjective   Joan Brown is a 84 y.o. female who presents for the following: MOHS Surgery (Left Lower Leg- Anterior  BCC)    According to the patient, the lesion has been present for approximately greater than 1 year at the time of diagnosis.  The lesion is not causing symptoms.  The lesion has not been treated previously.    The patient does not have a pacemaker / defibrillator.  The patient does have a heart valve / joint replacement.    The patient is on blood thinners.  The patient does not have a history of hepatitis B or C.  The patient does not have a history of HIV.  The patient does have a history of immunosuppression (e.g. organ transplantation, malignancy, medications)    Review of Systems:  No other skin or systemic complaints other than what is documented elsewhere in the note.    MEDICAL HISTORY: clinically relevant history including significant past medical history, medications and allergies was reviewed and documented in Epic.    Objective   Well appearing patient in no apparent distress; mood and affect are within normal limits.  Vital signs: See record.  Noted on the   Left Lower Leg - Anterior  Is a 3.7 x 4.8 cm scar    The patient confirmed the identified site.    Discussion:  The nature of the diagnosis was explained. The lesion is a skin cancer.  It has a risk of local growth and distant spread. The condition is associated with sun exposure.  Warning signs of non-melanoma skin cancer discussed. Patient was instructed to perform monthly self skin examination.  We recommended that the patient have regular full skin exams given an increased risk of subsequent skin cancers. The  patient was instructed to use sun protective behaviors including use of broad spectrum sunscreens and sun protective clothing to reduce risk of skin cancers.      Risks, benefits, side effects of Mohs surgery were discussed with patient and the patient voiced understanding.  It was explained that even though the cure rate of Mohs is very high it is not 100%. Risks of surgery including but not limited to bleeding, infection, numbness, nerve damage, and scar were reviewed.  Discussion included wound care requirements, activity restrictions, likely scar outcome and time to heal.     After Mohs surgery, the defect may need to be repaired surgically and the scar may be longer than the original lesion.  Reconstruction options, risks, and benefits were reviewed including second intention healing, linear repair (4-1 ratio was explained), local flaps, skin grafts, cartilage grafts and interpolation flaps (the need for multiple surgeries was explained). Possible outcomes were reviewed including likely scar appearance, failure of flap survival, infection, bleeding and the need for revision surgery.     The pathology was reviewed, the photograph was reviewed, and the referring physician's note was reviewed.    Patient elected for Mohs surgery.     Luba JAMA RN  am scribing for, and in the presence of MD EVITA Castro Christina Y Wong, MD, personally performed the services described in the documentation as scribed by Luba Saenz RN in my presence, and confirm it is both accurate and complete.

## 2025-05-20 ENCOUNTER — PHARMACY VISIT (OUTPATIENT)
Dept: PHARMACY | Facility: CLINIC | Age: 85
End: 2025-05-20
Payer: MEDICARE

## 2025-05-21 ENCOUNTER — TELEPHONE (OUTPATIENT)
Dept: PRIMARY CARE | Facility: CLINIC | Age: 85
End: 2025-05-21
Payer: MEDICARE

## 2025-05-21 DIAGNOSIS — I10 PRIMARY HYPERTENSION: Primary | ICD-10-CM

## 2025-05-21 DIAGNOSIS — M81.0 OSTEOPOROSIS, POST-MENOPAUSAL: ICD-10-CM

## 2025-05-25 LAB
ALBUMIN SERPL-MCNC: 4.3 G/DL (ref 3.6–5.1)
ALP SERPL-CCNC: 53 U/L (ref 37–153)
ALT SERPL-CCNC: 12 U/L (ref 6–29)
ANION GAP SERPL CALCULATED.4IONS-SCNC: 8 MMOL/L (CALC) (ref 7–17)
ANION GAP SERPL CALCULATED.4IONS-SCNC: 8 MMOL/L (CALC) (ref 7–17)
AST SERPL-CCNC: 19 U/L (ref 10–35)
BILIRUB SERPL-MCNC: 0.6 MG/DL (ref 0.2–1.2)
BUN SERPL-MCNC: 11 MG/DL (ref 7–25)
BUN SERPL-MCNC: 11 MG/DL (ref 7–25)
BUN/CREAT SERPL: NORMAL (CALC) (ref 6–22)
CALCIUM SERPL-MCNC: 9 MG/DL (ref 8.6–10.4)
CALCIUM SERPL-MCNC: 9 MG/DL (ref 8.6–10.4)
CHLORIDE SERPL-SCNC: 101 MMOL/L (ref 98–110)
CHLORIDE SERPL-SCNC: 101 MMOL/L (ref 98–110)
CO2 SERPL-SCNC: 27 MMOL/L (ref 20–32)
CO2 SERPL-SCNC: 27 MMOL/L (ref 20–32)
CREAT SERPL-MCNC: 0.76 MG/DL (ref 0.6–0.95)
CREAT SERPL-MCNC: 0.76 MG/DL (ref 0.6–0.95)
EGFRCR SERPLBLD CKD-EPI 2021: 77 ML/MIN/1.73M2
EGFRCR SERPLBLD CKD-EPI 2021: 77 ML/MIN/1.73M2
GLUCOSE SERPL-MCNC: 89 MG/DL (ref 65–99)
GLUCOSE SERPL-MCNC: 89 MG/DL (ref 65–99)
POTASSIUM SERPL-SCNC: 4.6 MMOL/L (ref 3.5–5.3)
POTASSIUM SERPL-SCNC: 4.6 MMOL/L (ref 3.5–5.3)
PROT SERPL-MCNC: 6.8 G/DL (ref 6.1–8.1)
SODIUM SERPL-SCNC: 136 MMOL/L (ref 135–146)
SODIUM SERPL-SCNC: 136 MMOL/L (ref 135–146)

## 2025-05-27 ENCOUNTER — APPOINTMENT (OUTPATIENT)
Dept: PRIMARY CARE | Facility: CLINIC | Age: 85
End: 2025-05-27
Payer: MEDICARE

## 2025-05-27 ENCOUNTER — OFFICE VISIT (OUTPATIENT)
Dept: DERMATOLOGY | Facility: CLINIC | Age: 85
End: 2025-05-27
Payer: MEDICARE

## 2025-05-27 ENCOUNTER — TELEPHONE (OUTPATIENT)
Dept: DERMATOLOGY | Facility: CLINIC | Age: 85
End: 2025-05-27

## 2025-05-27 DIAGNOSIS — S81.802D LEG WOUND, LEFT, SUBSEQUENT ENCOUNTER: Primary | ICD-10-CM

## 2025-05-27 PROCEDURE — 96372 THER/PROPH/DIAG INJ SC/IM: CPT | Performed by: FAMILY MEDICINE

## 2025-05-27 PROCEDURE — 99213 OFFICE O/P EST LOW 20 MIN: CPT | Performed by: DERMATOLOGY

## 2025-05-27 RX ORDER — DOXYCYCLINE 100 MG/1
100 CAPSULE ORAL 2 TIMES DAILY
Qty: 20 CAPSULE | Refills: 0 | Status: SHIPPED | OUTPATIENT
Start: 2025-05-27 | End: 2025-06-06

## 2025-05-27 NOTE — PROGRESS NOTES
Office Follow Up Note    Visit Summary  Chief Complaint    1. Complaint Wound check.    Joan Brown is a 84 y.o. female who presents for 9 day follow up after surgery for a basal cell carcinoma. The patient has concerns of pain and redness. She had the ace wrap in place but stopped using it on Sunday. She has been walking for exercise. She complains of pain.    Location Operation site location: Left Lower Leg- Anterior    On exam,  Ms. Brown is well-appearing and in no apparent distress. The surgical site appears clean. There is minimal surrounding erythema. Minimal swelling appreciated.     Assessment and Plan:  Well granulating wound. Discussed elevation and compression socks. Will start doxycycline 100mg BID for 10 days. Do not lie down within 2 hours of taking the medication, avoid the sun while on the medicaiton.  The patient was reassured that the wound is healing appropriately.   The dressing was removed, the wound cleaned a a new dressing reapplied.     The patient was advised on the importance of routine skin monitoring including follow up with general dermatology and instructed to call with any further concerns. The patient will return as needed     Luba JAMA RN  am scribing for, and in the presence of MD EVITA Castro Christina Y Wong, MD, personally performed the services described in the documentation as scribed by Luba Saenz RN in my presence, and confirm it is both accurate and complete.

## 2025-05-27 NOTE — PROGRESS NOTES
Patient ID: Joan Brown is a 84 y.o. female.    Procedures pt came in today for a prolia injection. Pt pharmacy delivered prolia to office. Pt was injected into right arm .patient tolerated well no complications.

## 2025-05-27 NOTE — TELEPHONE ENCOUNTER
Had surgery with Dr. Sheppard 5/19/25 on her left lower leg. Pt called stating that last week she experienced sharp pain in her leg. She had a neighbor who is a nurse come over and look at it, believes it is infected. Pt had lost her wound care sheet, gave her the nurse line #.Coming in today as post op.

## 2025-06-12 DIAGNOSIS — M62.838 MUSCLE SPASM: Primary | ICD-10-CM

## 2025-06-12 NOTE — TELEPHONE ENCOUNTER
Pt called stating she had really bad thigh cramps. Pt stated she was put on potassium and started doing leg exercises to help with legs cramps that happens about 5 times a week. But last night the cramp was in her thigh and she wants to know what she should do?

## 2025-06-18 RX ORDER — METHOCARBAMOL 500 MG/1
500 TABLET, FILM COATED ORAL DAILY PRN
Qty: 30 TABLET | Refills: 5 | Status: SHIPPED | OUTPATIENT
Start: 2025-06-18 | End: 2025-08-17

## 2025-08-11 ENCOUNTER — HOSPITAL ENCOUNTER (OUTPATIENT)
Dept: RADIOLOGY | Facility: HOSPITAL | Age: 85
Discharge: HOME | End: 2025-08-11
Payer: MEDICARE

## 2025-08-11 VITALS — BODY MASS INDEX: 23.6 KG/M2 | WEIGHT: 125 LBS | HEIGHT: 61 IN

## 2025-08-11 DIAGNOSIS — Z12.31 ENCOUNTER FOR SCREENING MAMMOGRAM FOR MALIGNANT NEOPLASM OF BREAST: ICD-10-CM

## 2025-08-11 DIAGNOSIS — Z78.0 MENOPAUSE: ICD-10-CM

## 2025-08-11 PROCEDURE — 77067 SCR MAMMO BI INCL CAD: CPT

## 2025-08-11 PROCEDURE — 77067 SCR MAMMO BI INCL CAD: CPT | Performed by: RADIOLOGY

## 2025-08-11 PROCEDURE — 77080 DXA BONE DENSITY AXIAL: CPT

## 2025-08-11 PROCEDURE — 77063 BREAST TOMOSYNTHESIS BI: CPT | Performed by: RADIOLOGY

## 2025-08-11 PROCEDURE — 77080 DXA BONE DENSITY AXIAL: CPT | Performed by: STUDENT IN AN ORGANIZED HEALTH CARE EDUCATION/TRAINING PROGRAM

## 2025-09-25 ENCOUNTER — APPOINTMENT (OUTPATIENT)
Dept: CARDIOLOGY | Facility: HOSPITAL | Age: 85
End: 2025-09-25
Payer: MEDICARE

## 2025-10-20 ENCOUNTER — APPOINTMENT (OUTPATIENT)
Dept: DERMATOLOGY | Facility: CLINIC | Age: 85
End: 2025-10-20
Payer: MEDICARE

## 2025-10-21 ENCOUNTER — APPOINTMENT (OUTPATIENT)
Dept: CARDIOLOGY | Facility: CLINIC | Age: 85
End: 2025-10-21
Payer: MEDICARE

## 2025-11-11 ENCOUNTER — APPOINTMENT (OUTPATIENT)
Dept: PRIMARY CARE | Facility: CLINIC | Age: 85
End: 2025-11-11
Payer: MEDICARE